# Patient Record
Sex: FEMALE | Race: WHITE | NOT HISPANIC OR LATINO | Employment: FULL TIME
[De-identification: names, ages, dates, MRNs, and addresses within clinical notes are randomized per-mention and may not be internally consistent; named-entity substitution may affect disease eponyms.]

---

## 2019-05-06 ENCOUNTER — TRANSCRIBE ORDERS (OUTPATIENT)
Dept: SCHEDULING | Age: 29
End: 2019-05-06

## 2019-05-06 DIAGNOSIS — Z36.4 ENCOUNTER FOR ANTENATAL SCREENING FOR FETAL GROWTH RETARDATION: Primary | ICD-10-CM

## 2019-05-10 ENCOUNTER — HOSPITAL ENCOUNTER (OUTPATIENT)
Dept: PERINATAL CARE | Facility: HOSPITAL | Age: 29
Discharge: HOME | End: 2019-05-10
Attending: OBSTETRICS & GYNECOLOGY
Payer: COMMERCIAL

## 2019-05-10 VITALS — WEIGHT: 190 LBS | BODY MASS INDEX: 30.53 KG/M2 | HEIGHT: 66 IN

## 2019-05-10 DIAGNOSIS — Z36.4 ENCOUNTER FOR ANTENATAL SCREENING FOR FETAL GROWTH RETARDATION: ICD-10-CM

## 2019-05-10 DIAGNOSIS — O36.80X0 ENCOUNTER TO DETERMINE FETAL VIABILITY OF PREGNANCY, SINGLE OR UNSPECIFIED FETUS: ICD-10-CM

## 2019-05-10 DIAGNOSIS — Z3A.01 PREGNANCY WITH FEWER THAN 8 COMPLETED WEEKS GESTATION: ICD-10-CM

## 2019-05-10 PROCEDURE — 76801 OB US < 14 WKS SINGLE FETUS: CPT

## 2019-05-20 LAB
HBV SURFACE AG SER QL: NONREACTIVE
HIV 1+2 AB+HIV1 P24 AG SERPL QL IA: NONREACTIVE
RUBELLA IGG SCREEN: NORMAL

## 2019-05-24 ENCOUNTER — TRANSCRIBE ORDERS (OUTPATIENT)
Dept: SCHEDULING | Age: 29
End: 2019-05-24

## 2019-05-24 DIAGNOSIS — Z36.82 ENCOUNTER FOR NUCHAL TRANSLUCENCY TESTING: Primary | ICD-10-CM

## 2019-06-27 ENCOUNTER — TRANSCRIBE ORDERS (OUTPATIENT)
Dept: REGISTRATION | Facility: HOSPITAL | Age: 29
End: 2019-06-27

## 2019-06-27 ENCOUNTER — HOSPITAL ENCOUNTER (OUTPATIENT)
Dept: PERINATAL CARE | Facility: HOSPITAL | Age: 29
Discharge: HOME | End: 2019-06-27
Attending: OBSTETRICS & GYNECOLOGY
Payer: COMMERCIAL

## 2019-06-27 DIAGNOSIS — Z36.82 ENCOUNTER FOR NUCHAL TRANSLUCENCY TESTING: Primary | ICD-10-CM

## 2019-06-27 DIAGNOSIS — Z36.82 ENCOUNTER FOR (NT) NUCHAL TRANSLUCENCY SCAN: ICD-10-CM

## 2019-06-27 DIAGNOSIS — Z3A.12 12 WEEKS GESTATION OF PREGNANCY: ICD-10-CM

## 2019-06-27 DIAGNOSIS — O35.10X0 SUSPECTED CHROMOSOME ANOMALY OF FETUS AFFECTING MANAGEMENT OF MOTHER, ANTEPARTUM, SINGLE OR UNSPECIFIED FETUS: ICD-10-CM

## 2019-06-27 DIAGNOSIS — Z36.82 ENCOUNTER FOR NUCHAL TRANSLUCENCY TESTING: ICD-10-CM

## 2019-06-27 PROCEDURE — 76801 OB US < 14 WKS SINGLE FETUS: CPT

## 2019-07-18 ENCOUNTER — TRANSCRIBE ORDERS (OUTPATIENT)
Dept: SCHEDULING | Age: 29
End: 2019-07-18

## 2019-07-18 DIAGNOSIS — Z36.3 ENCOUNTER FOR ANTENATAL SCREENING FOR MALFORMATION: Primary | ICD-10-CM

## 2019-08-20 ENCOUNTER — HOSPITAL ENCOUNTER (OUTPATIENT)
Dept: PERINATAL CARE | Facility: HOSPITAL | Age: 29
Discharge: HOME | End: 2019-08-20
Attending: OBSTETRICS & GYNECOLOGY
Payer: COMMERCIAL

## 2019-08-20 DIAGNOSIS — Z3A.20 20 WEEKS GESTATION OF PREGNANCY: ICD-10-CM

## 2019-08-20 DIAGNOSIS — E03.9 MATERNAL HYPOTHYROIDISM, ANTEPARTUM, SECOND TRIMESTER: ICD-10-CM

## 2019-08-20 DIAGNOSIS — Z36.3 ANTENATAL SCREENING FOR MALFORMATION USING ULTRASONICS: ICD-10-CM

## 2019-08-20 DIAGNOSIS — Z36.86 ENCOUNTER FOR ANTENATAL SCREENING FOR CERVICAL LENGTH: ICD-10-CM

## 2019-08-20 DIAGNOSIS — O35.9XX0 SUSPECTED FETAL ABNORMALITY AFFECTING MANAGEMENT OF MOTHER, ANTEPARTUM, SINGLE OR UNSPECIFIED FETUS: ICD-10-CM

## 2019-08-20 DIAGNOSIS — Z36.3 ENCOUNTER FOR ANTENATAL SCREENING FOR MALFORMATION: ICD-10-CM

## 2019-08-20 DIAGNOSIS — O99.282 MATERNAL HYPOTHYROIDISM, ANTEPARTUM, SECOND TRIMESTER: ICD-10-CM

## 2019-08-20 PROCEDURE — 76811 OB US DETAILED SNGL FETUS: CPT

## 2019-09-27 ENCOUNTER — TRANSCRIBE ORDERS (OUTPATIENT)
Dept: SCHEDULING | Age: 29
End: 2019-09-27

## 2019-09-27 DIAGNOSIS — O99.282 ENDOCRINE, NUTRITIONAL AND METABOLIC DISEASES COMPLICATING PREGNANCY, SECOND TRIMESTER: Primary | ICD-10-CM

## 2019-09-27 DIAGNOSIS — Z3A.28 28 WEEKS GESTATION OF PREGNANCY: ICD-10-CM

## 2019-09-27 LAB — RPR SER QL: NORMAL

## 2019-10-18 ENCOUNTER — HOSPITAL ENCOUNTER (OUTPATIENT)
Dept: PERINATAL CARE | Facility: HOSPITAL | Age: 29
Discharge: HOME | End: 2019-10-18
Attending: OBSTETRICS & GYNECOLOGY
Payer: COMMERCIAL

## 2019-10-18 DIAGNOSIS — O99.282 ENDOCRINE, NUTRITIONAL AND METABOLIC DISEASES COMPLICATING PREGNANCY, SECOND TRIMESTER: ICD-10-CM

## 2019-10-18 DIAGNOSIS — Z3A.29 29 WEEKS GESTATION OF PREGNANCY: ICD-10-CM

## 2019-10-18 DIAGNOSIS — Z3A.28 28 WEEKS GESTATION OF PREGNANCY: ICD-10-CM

## 2019-10-18 PROCEDURE — 76816 OB US FOLLOW-UP PER FETUS: CPT

## 2019-11-27 ENCOUNTER — HOSPITAL ENCOUNTER (OUTPATIENT)
Facility: HOSPITAL | Age: 29
Discharge: HOME | End: 2019-11-27
Attending: OBSTETRICS & GYNECOLOGY | Admitting: OBSTETRICS & GYNECOLOGY
Payer: COMMERCIAL

## 2019-11-27 VITALS
BODY MASS INDEX: 33.59 KG/M2 | SYSTOLIC BLOOD PRESSURE: 122 MMHG | TEMPERATURE: 97.7 F | DIASTOLIC BLOOD PRESSURE: 70 MMHG | HEART RATE: 74 BPM | HEIGHT: 66 IN | WEIGHT: 209 LBS | RESPIRATION RATE: 16 BRPM

## 2019-11-27 PROCEDURE — 4A0HXCZ MEASUREMENT OF PRODUCTS OF CONCEPTION, CARDIAC RATE, EXTERNAL APPROACH: ICD-10-PCS | Performed by: OBSTETRICS & GYNECOLOGY

## 2019-11-27 PROCEDURE — 59025 FETAL NON-STRESS TEST: CPT

## 2019-11-27 SDOH — HEALTH STABILITY: MENTAL HEALTH: HOW OFTEN DO YOU HAVE A DRINK CONTAINING ALCOHOL?: NEVER

## 2019-11-27 NOTE — NURSING NOTE
Pt came in for decreased fetal movement. Pt was put on monitor and baby was reactive with a category 1 strip. Pt stated feeling the baby move while on the monitor. Pt stated feeling no pain. Dr. Murphy reviewed strip and said pt is able to be d/c. Pt given discharge instructions and verbalized understanding and signed consents. Pt left hospital in good condition.

## 2019-12-06 ENCOUNTER — TRANSCRIBE ORDERS (OUTPATIENT)
Dept: SCHEDULING | Age: 29
End: 2019-12-06

## 2019-12-06 LAB — GP B STREP SPEC QL CULT: NORMAL

## 2019-12-10 ENCOUNTER — HOSPITAL ENCOUNTER (OUTPATIENT)
Dept: PERINATAL CARE | Facility: HOSPITAL | Age: 29
Discharge: HOME | End: 2019-12-10
Attending: OBSTETRICS & GYNECOLOGY
Payer: COMMERCIAL

## 2019-12-10 DIAGNOSIS — Z3A.36 36 WEEKS GESTATION OF PREGNANCY: ICD-10-CM

## 2019-12-10 PROCEDURE — 76816 OB US FOLLOW-UP PER FETUS: CPT

## 2019-12-28 ENCOUNTER — HOSPITAL ENCOUNTER (OUTPATIENT)
Facility: HOSPITAL | Age: 29
Discharge: HOME | End: 2019-12-28
Attending: OBSTETRICS & GYNECOLOGY | Admitting: OBSTETRICS & GYNECOLOGY
Payer: COMMERCIAL

## 2019-12-28 VITALS
TEMPERATURE: 97.6 F | SYSTOLIC BLOOD PRESSURE: 124 MMHG | HEART RATE: 90 BPM | DIASTOLIC BLOOD PRESSURE: 79 MMHG | RESPIRATION RATE: 18 BRPM

## 2019-12-28 PROBLEM — Z3A.39 39 WEEKS GESTATION OF PREGNANCY: Status: ACTIVE | Noted: 2019-12-28

## 2019-12-28 PROBLEM — O36.8130 DECREASED FETAL MOVEMENTS IN THIRD TRIMESTER: Status: ACTIVE | Noted: 2019-12-28

## 2019-12-28 PROCEDURE — 59025 FETAL NON-STRESS TEST: CPT

## 2019-12-28 NOTE — NURSING NOTE
"Pt presents to triage with complaint of decreased fetal movement. NST reactive. Pt denies leaking of fluid, vaginal bleeding, and contractions at this time. md jean reviewing strip and talking with pt at bedside. Plan of care discussed. Amniotic fluid assessed by md jean using bedside ultrasound. md jean discharging pt to home. Pt states she has \"no concerns or questions at this time.\" pt ambulates off floor with .   "

## 2019-12-29 NOTE — H&P
Obstetrics H&P    HPI     Luna Felix is a 29 y.o. female  at 39w1d with an estimated due date of 1/3/2020, who presents reporting decreased fetal movement today. Reports is feeling the baby move, but it is a little less than normal. Denies contractions, bleeding or leakage of fluid.    OB History:   OB History    Para Term  AB Living   1 0 0 0 0 0   SAB TAB Ectopic Multiple Live Births   0 0 0 0 0      # Outcome Date GA Lbr Papi/2nd Weight Sex Delivery Anes PTL Lv   1 Current                Medical History:   Past Medical History:   Diagnosis Date   • Disease of thyroid gland        Surgical History:   Past Surgical History:   Procedure Laterality Date   • WISDOM TOOTH EXTRACTION         Social History:   Social History     Socioeconomic History   • Marital status:      Spouse name: Robert   • Number of children: Not on file   • Years of education: Not on file   • Highest education level: Not on file   Occupational History   • Not on file   Social Needs   • Financial resource strain: Not on file   • Food insecurity:     Worry: Not on file     Inability: Not on file   • Transportation needs:     Medical: Not on file     Non-medical: Not on file   Tobacco Use   • Smoking status: Never Smoker   • Smokeless tobacco: Never Used   Substance and Sexual Activity   • Alcohol use: Never     Frequency: Never   • Drug use: Never   • Sexual activity: Yes     Partners: Male     Birth control/protection: None   Lifestyle   • Physical activity:     Days per week: Not on file     Minutes per session: Not on file   • Stress: Not on file   Relationships   • Social connections:     Talks on phone: Not on file     Gets together: Not on file     Attends [employer] service: Not on file     Active member of club or organization: Not on file     Attends meetings of clubs or organizations: Not on file     Relationship status: Not on file   • Intimate partner violence:     Fear of current or ex partner: Not  on file     Emotionally abused: Not on file     Physically abused: Not on file     Forced sexual activity: Not on file   Other Topics Concern   • Not on file   Social History Narrative   • Not on file        Family History:   Family History   Problem Relation Age of Onset   • Stroke Paternal Grandfather    • Cancer Paternal Grandmother    • Cancer Maternal Grandmother    • Stroke Maternal Grandfather    • Hypertension Biological Father        Allergies: Ceclor [cefaclor]    Prior to Admission medications    Not on File       Review of Systems  Pertinent items are noted in HPI.    Objective     Vital Signs for the last 24 hours:  Temp:  [36.4 °C (97.6 °F)] 36.4 °C (97.6 °F)  Heart Rate:  [90] 90  Resp:  [18] 18  BP: (124)/(79) 124/79    Latest cervical exam:                Additional Tests:   Sterile Speculum Exam: no    Fetal Monitoring:  FHR Baseline: 120  FHR Variability: moderate  FHR Accelerations: present  FHR Decelerations: absent    Contraction Frequency: irregular    Exam:  General Appearance: Alert, cooperative, no acute distress  Lungs: Respirations unlabored  Abdomen: gravid, nontender  Genitalia: Deferred  Extremities: no edema or calf tenderness  Neurologic: grossly intact without focal deficits    Bedside Ultrasounds:   cephalic  Deepest vertical pocket = 9cm        Assessment/Plan     Luna Felix is a 29 y.o. female  at 39w1d admitted for observation for decreased fetal movement    1) FHR: Category I, reactive  2) Borderline elevated deepest vertical pocket of 9 cm, plan for outpatient PTC scan scan for formal AIDA and growth scan.  3) DFM: FHT Category 1 and reactive, kick count precautions reviewed      Anjelica Murphy MD  2019  7:32 PM

## 2019-12-31 ENCOUNTER — HOSPITAL ENCOUNTER (OUTPATIENT)
Facility: HOSPITAL | Age: 29
Discharge: HOME | End: 2019-12-31
Attending: OBSTETRICS & GYNECOLOGY | Admitting: OBSTETRICS & GYNECOLOGY
Payer: COMMERCIAL

## 2019-12-31 VITALS
DIASTOLIC BLOOD PRESSURE: 78 MMHG | WEIGHT: 213 LBS | RESPIRATION RATE: 16 BRPM | SYSTOLIC BLOOD PRESSURE: 125 MMHG | TEMPERATURE: 97.5 F | BODY MASS INDEX: 34.23 KG/M2 | HEART RATE: 68 BPM | HEIGHT: 66 IN

## 2019-12-31 LAB
ALT SERPL-CCNC: 13 IU/L (ref 11–54)
AST SERPL-CCNC: 20 IU/L (ref 15–41)
CREAT SERPL-MCNC: 0.6 MG/DL
CREAT UR-MCNC: 13.7 MG/DL
ERYTHROCYTE [DISTWIDTH] IN BLOOD BY AUTOMATED COUNT: 14.2 % (ref 11.7–14.4)
GFR SERPL CREATININE-BSD FRML MDRD: >60 ML/MIN/1.73M*2
HCT VFR BLDCO AUTO: 36.1 %
HGB BLD-MCNC: 12.1 G/DL (ref 11.8–15.7)
LDH SERPL L TO P-CCNC: 139 IU/L (ref 98–192)
MCH RBC QN AUTO: 28.5 PG (ref 28–33.2)
MCHC RBC AUTO-ENTMCNC: 33.5 G/DL (ref 32.2–35.5)
MCV RBC AUTO: 84.9 FL (ref 83–98)
PDW BLD AUTO: 13.1 FL (ref 9.4–12.3)
PLATELET # BLD AUTO: 227 K/UL
PROT UR-MCNC: <6 MG/DL
PROT/CREAT UR: NORMAL MG/G{CREAT}
RBC # BLD AUTO: 4.25 M/UL (ref 3.93–5.22)
URATE SERPL-MCNC: 5.5 MG/DL (ref 2.6–8)
WBC # BLD AUTO: 9.23 K/UL

## 2019-12-31 PROCEDURE — 36415 COLL VENOUS BLD VENIPUNCTURE: CPT | Performed by: OBSTETRICS & GYNECOLOGY

## 2019-12-31 PROCEDURE — 84460 ALANINE AMINO (ALT) (SGPT): CPT | Performed by: OBSTETRICS & GYNECOLOGY

## 2019-12-31 PROCEDURE — 84450 TRANSFERASE (AST) (SGOT): CPT | Performed by: OBSTETRICS & GYNECOLOGY

## 2019-12-31 PROCEDURE — 63700000 HC SELF-ADMINISTRABLE DRUG: Performed by: OBSTETRICS & GYNECOLOGY

## 2019-12-31 PROCEDURE — 84156 ASSAY OF PROTEIN URINE: CPT | Performed by: OBSTETRICS & GYNECOLOGY

## 2019-12-31 PROCEDURE — 83615 LACTATE (LD) (LDH) ENZYME: CPT | Performed by: OBSTETRICS & GYNECOLOGY

## 2019-12-31 PROCEDURE — 84550 ASSAY OF BLOOD/URIC ACID: CPT | Performed by: OBSTETRICS & GYNECOLOGY

## 2019-12-31 PROCEDURE — 82565 ASSAY OF CREATININE: CPT | Performed by: OBSTETRICS & GYNECOLOGY

## 2019-12-31 PROCEDURE — 85027 COMPLETE CBC AUTOMATED: CPT | Performed by: OBSTETRICS & GYNECOLOGY

## 2019-12-31 RX ORDER — BUTALBITAL, ACETAMINOPHEN AND CAFFEINE 50; 325; 40 MG/1; MG/1; MG/1
1 TABLET ORAL AS NEEDED
Status: DISCONTINUED | OUTPATIENT
Start: 2019-12-31 | End: 2020-01-01 | Stop reason: HOSPADM

## 2019-12-31 RX ADMIN — BUTALBITAL, ACETAMINOPHEN AND CAFFEINE 1 TABLET: 50; 325; 40 TABLET ORAL at 19:19

## 2019-12-31 SDOH — ECONOMIC STABILITY: FOOD INSECURITY: WITHIN THE PAST 12 MONTHS, THE FOOD YOU BOUGHT JUST DIDN'T LAST AND YOU DIDN'T HAVE MONEY TO GET MORE.: NEVER TRUE

## 2019-12-31 SDOH — ECONOMIC STABILITY: FOOD INSECURITY: WITHIN THE PAST 12 MONTHS, YOU WORRIED THAT YOUR FOOD WOULD RUN OUT BEFORE YOU GOT THE MONEY TO BUY MORE.: NEVER TRUE

## 2019-12-31 SDOH — ECONOMIC STABILITY: FOOD INSECURITY: HOW HARD IS IT FOR YOU TO PAY FOR THE VERY BASICS LIKE FOOD, HOUSING, MEDICAL CARE, AND HEATING?: NOT HARD AT ALL

## 2019-12-31 SDOH — ECONOMIC STABILITY: TRANSPORTATION INSECURITY: IN THE PAST 12 MONTHS, HAS LACK OF TRANSPORTATION KEPT YOU FROM MEDICAL APPOINTMENTS OR FROM GETTING MEDICATIONS?: NO

## 2019-12-31 ASSESSMENT — ACTIVITIES OF DAILY LIVING (ADL): LACK_OF_TRANSPORTATION: NO

## 2019-12-31 NOTE — NURSING NOTE
Pt states she feels some leaking of fluid since this am. NO gush of water but occasional leaking. +fetal movement. Cat 1 tracing, afebril. C/O headache with history of migranes. NO migraine since second trimester. Light sensitivity noted. Pain 6/10 in head. Denies contractions. Uterine irritability noted on toco. 's/80's. Baseline in office 120/70's. Dr Murphy aware of above and coming in to assess pt.

## 2020-01-01 NOTE — H&P
Obstetrics H&P    HPI     Luna Felix is a 29 y.o. female  at 39w4d with an estimated due date of 1/3/2020, who presents complaining of a headache all day and mild nausea, reports a history of migraines in the past, has not taken any Tylenol or other medication for headache. Reports good fetal movement, denies bleeding, but has noticed some watery discharge since this morning.    OB History:   OB History    Para Term  AB Living   1 0 0 0 0 0   SAB TAB Ectopic Multiple Live Births   0 0 0 0 0      # Outcome Date GA Lbr Papi/2nd Weight Sex Delivery Anes PTL Lv   1 Current                Medical History:   Past Medical History:   Diagnosis Date   • Chronic migraine    • Disease of thyroid gland        Surgical History:   Past Surgical History:   Procedure Laterality Date   • WISDOM TOOTH EXTRACTION         Social History:   Social History     Socioeconomic History   • Marital status:      Spouse name: Robert   • Number of children: None   • Years of education: None   • Highest education level: None   Occupational History   • None   Social Needs   • Financial resource strain: Not hard at all   • Food insecurity:     Worry: Never true     Inability: Never true   • Transportation needs:     Medical: No     Non-medical: No   Tobacco Use   • Smoking status: Never Smoker   • Smokeless tobacco: Never Used   Substance and Sexual Activity   • Alcohol use: Never     Frequency: Never   • Drug use: Never   • Sexual activity: Yes     Partners: Male     Birth control/protection: None   Lifestyle   • Physical activity:     Days per week: None     Minutes per session: None   • Stress: None   Relationships   • Social connections:     Talks on phone: None     Gets together: None     Attends [employer] service: None     Active member of club or organization: None     Attends meetings of clubs or organizations: None     Relationship status: None   • Intimate partner violence:     Fear of current or ex  partner: None     Emotionally abused: None     Physically abused: None     Forced sexual activity: None   Other Topics Concern   • None   Social History Narrative   • None        Family History:   Family History   Problem Relation Age of Onset   • Stroke Paternal Grandfather    • Cancer Paternal Grandmother    • Cancer Maternal Grandmother    • Stroke Maternal Grandfather    • Hypertension Biological Father        Allergies: Ceclor [cefaclor]    Prior to Admission medications    Not on File       Review of Systems  Pertinent items are noted in HPI.    Objective     Vital Signs for the last 24 hours:  Temp:  [36.4 °C (97.5 °F)] 36.4 °C (97.5 °F)  Heart Rate:  [74-82] 82  Resp:  [16] 16  BP: (130-132)/(82-83) 132/83    Latest cervical exam:  Cervical Dilation (cm): 0        Method: sterile exam per physician (19)    Additional Tests:   Sterile Speculum Exam: yes  Pooling: no  Nitrazine: no  Ferning: no  Bleeding: no      Fetal Monitoring:  FHR Baseline: 120  FHR Variability: moderate  FHR Accelerations: present  FHR Decelerations: absent    Contraction Frequency: acontractile    Exam:  General Appearance: Alert, cooperative, no acute distress  Lungs: Respirations unlabored  Heart: Regular rate and rhythm  Abdomen: gravid, nontender  Genitalia: See vaginal exam  Extremities: no edema or calf tenderness  Neurologic: grossly intact without focal deficits, +1 upper and lower extremity reflexes      Bedside Ultrasounds:   cephalic  Deepest vertical pocket = 8.6 cm      Assessment/Plan     Luna Felix is a 29 y.o. female  at 39w4d admitted for evaluation of pre-eclampsia with headache and nausea     1) FHR: Category I  2) PIH: normal range BP, suspect headache and nausea are related to migraine, will get PIH labs and try Fioricet, c/w serial BP monitoring.   3) LOF: normal DVP on bedside u/s, exam negative for nit/pool/fern, no acute concern for ROM    Anjelica Murphy MD  2019  7:10  PM

## 2020-01-06 ENCOUNTER — HOSPITAL ENCOUNTER (INPATIENT)
Facility: HOSPITAL | Age: 30
LOS: 5 days | Discharge: HOME | End: 2020-01-11
Attending: OBSTETRICS & GYNECOLOGY | Admitting: OBSTETRICS & GYNECOLOGY
Payer: COMMERCIAL

## 2020-01-06 DIAGNOSIS — O61.0 FAILED MEDICAL INDUCTION OF LABOR: Primary | ICD-10-CM

## 2020-01-06 PROBLEM — Z34.90 ENCOUNTER FOR ELECTIVE INDUCTION OF LABOR: Status: ACTIVE | Noted: 2020-01-06

## 2020-01-06 LAB
ABO + RH BLD: NORMAL
ABO + RH BLD: NORMAL
BLD GP AB SCN SERPL QL: NEGATIVE
D AG BLD QL: POSITIVE
D AG BLD QL: POSITIVE
ERYTHROCYTE [DISTWIDTH] IN BLOOD BY AUTOMATED COUNT: 14.5 % (ref 11.7–14.4)
HCT VFR BLDCO AUTO: 33.4 %
HGB BLD-MCNC: 11.2 G/DL (ref 11.8–15.7)
LABORATORY COMMENT REPORT: NORMAL
LABORATORY COMMENT REPORT: NORMAL
MCH RBC QN AUTO: 28.6 PG (ref 28–33.2)
MCHC RBC AUTO-ENTMCNC: 33.5 G/DL (ref 32.2–35.5)
MCV RBC AUTO: 85.4 FL (ref 83–98)
PDW BLD AUTO: 13.6 FL (ref 9.4–12.3)
PLATELET # BLD AUTO: 210 K/UL
RBC # BLD AUTO: 3.91 M/UL (ref 3.93–5.22)
WBC # BLD AUTO: 10.31 K/UL

## 2020-01-06 PROCEDURE — 63700000 HC SELF-ADMINISTRABLE DRUG

## 2020-01-06 PROCEDURE — 36415 COLL VENOUS BLD VENIPUNCTURE: CPT | Performed by: OBSTETRICS & GYNECOLOGY

## 2020-01-06 PROCEDURE — 12000000 HC ROOM AND CARE MED/SURG

## 2020-01-06 PROCEDURE — 86901 BLOOD TYPING SEROLOGIC RH(D): CPT

## 2020-01-06 PROCEDURE — 85027 COMPLETE CBC AUTOMATED: CPT | Performed by: OBSTETRICS & GYNECOLOGY

## 2020-01-06 PROCEDURE — 86780 TREPONEMA PALLIDUM: CPT | Performed by: OBSTETRICS & GYNECOLOGY

## 2020-01-06 RX ORDER — SODIUM CHLORIDE, SODIUM LACTATE, POTASSIUM CHLORIDE, CALCIUM CHLORIDE 600; 310; 30; 20 MG/100ML; MG/100ML; MG/100ML; MG/100ML
125 INJECTION, SOLUTION INTRAVENOUS CONTINUOUS
Status: DISCONTINUED | OUTPATIENT
Start: 2020-01-06 | End: 2020-01-11 | Stop reason: HOSPADM

## 2020-01-06 RX ORDER — LEVOTHYROXINE SODIUM 125 UG/1
125 TABLET ORAL
COMMUNITY
End: 2024-10-21 | Stop reason: SDUPTHER

## 2020-01-06 RX ADMIN — DINOPROSTONE 10 MG: 10 INSERT VAGINAL at 20:06

## 2020-01-07 ENCOUNTER — ANESTHESIA (INPATIENT)
Dept: OBSTETRICS AND GYNECOLOGY | Facility: HOSPITAL | Age: 30
End: 2020-01-07
Payer: COMMERCIAL

## 2020-01-07 ENCOUNTER — ANESTHESIA EVENT (INPATIENT)
Dept: OBSTETRICS AND GYNECOLOGY | Facility: HOSPITAL | Age: 30
End: 2020-01-07
Payer: COMMERCIAL

## 2020-01-07 LAB — T PALLIDUM AB SER QL IF: NONREACTIVE

## 2020-01-07 PROCEDURE — 3E033VJ INTRODUCTION OF OTHER HORMONE INTO PERIPHERAL VEIN, PERCUTANEOUS APPROACH: ICD-10-PCS | Performed by: OBSTETRICS & GYNECOLOGY

## 2020-01-07 PROCEDURE — 63700000 HC SELF-ADMINISTRABLE DRUG

## 2020-01-07 PROCEDURE — 25000000 HC PHARMACY GENERAL: Performed by: OBSTETRICS & GYNECOLOGY

## 2020-01-07 PROCEDURE — 12000000 HC ROOM AND CARE MED/SURG

## 2020-01-07 PROCEDURE — 63700000 HC SELF-ADMINISTRABLE DRUG: Performed by: OBSTETRICS & GYNECOLOGY

## 2020-01-07 PROCEDURE — 27200130 HC EPIDURAL ANES TRAY

## 2020-01-07 PROCEDURE — 25000000 HC PHARMACY GENERAL

## 2020-01-07 PROCEDURE — 3E0P7VZ INTRODUCTION OF HORMONE INTO FEMALE REPRODUCTIVE, VIA NATURAL OR ARTIFICIAL OPENING: ICD-10-PCS | Performed by: OBSTETRICS & GYNECOLOGY

## 2020-01-07 PROCEDURE — 25000000 HC PHARMACY GENERAL: Performed by: ANESTHESIOLOGY

## 2020-01-07 PROCEDURE — 63600000 HC DRUGS/DETAIL CODE: Performed by: OBSTETRICS & GYNECOLOGY

## 2020-01-07 RX ORDER — LIDOCAINE HYDROCHLORIDE AND EPINEPHRINE 15; 5 MG/ML; UG/ML
INJECTION, SOLUTION EPIDURAL AS NEEDED
Status: DISCONTINUED | OUTPATIENT
Start: 2020-01-07 | End: 2020-01-08 | Stop reason: SURG

## 2020-01-07 RX ORDER — SODIUM CHLORIDE, SODIUM LACTATE, POTASSIUM CHLORIDE, CALCIUM CHLORIDE 600; 310; 30; 20 MG/100ML; MG/100ML; MG/100ML; MG/100ML
125 INJECTION, SOLUTION INTRAVENOUS CONTINUOUS
Status: ACTIVE | OUTPATIENT
Start: 2020-01-07 | End: 2020-01-08

## 2020-01-07 RX ORDER — MISOPROSTOL 100 UG/1
25 TABLET ORAL ONCE
Status: COMPLETED | OUTPATIENT
Start: 2020-01-07 | End: 2020-01-07

## 2020-01-07 RX ORDER — EPHEDRINE SULFATE/0.9% NACL/PF 50 MG/5 ML
SYRINGE (ML) INTRAVENOUS
Status: DISPENSED
Start: 2020-01-07 | End: 2020-01-08

## 2020-01-07 RX ORDER — OXYTOCIN/0.9 % SODIUM CHLORIDE 30/500 ML
0-20 PLASTIC BAG, INJECTION (ML) INTRAVENOUS CONTINUOUS
Status: DISPENSED | OUTPATIENT
Start: 2020-01-07 | End: 2020-01-11

## 2020-01-07 RX ORDER — MISOPROSTOL 100 UG/1
50 TABLET ORAL EVERY 4 HOURS
Status: DISCONTINUED | OUTPATIENT
Start: 2020-01-07 | End: 2020-01-09 | Stop reason: HOSPADM

## 2020-01-07 RX ORDER — MISOPROSTOL 100 UG/1
TABLET ORAL
Status: COMPLETED
Start: 2020-01-07 | End: 2020-01-07

## 2020-01-07 RX ADMIN — Medication 50 ML: at 19:23

## 2020-01-07 RX ADMIN — OXYTOCIN-SODIUM CHLORIDE 0.9% IV SOLN 30 UNIT/500ML 1 MILLI-UNITS/MIN: 30-0.9/5 SOLUTION at 21:10

## 2020-01-07 RX ADMIN — MISOPROSTOL 25 MCG: 100 TABLET ORAL at 12:08

## 2020-01-07 RX ADMIN — LIDOCAINE HYDROCHLORIDE,EPINEPHRINE BITARTRATE 5 ML: 15; .005 INJECTION, SOLUTION EPIDURAL; INFILTRATION; INTRACAUDAL; PERINEURAL at 19:56

## 2020-01-07 RX ADMIN — SODIUM CHLORIDE, POTASSIUM CHLORIDE, SODIUM LACTATE AND CALCIUM CHLORIDE 125 ML/HR: 600; 310; 30; 20 INJECTION, SOLUTION INTRAVENOUS at 12:15

## 2020-01-07 RX ADMIN — MISOPROSTOL 50 MCG: 100 TABLET ORAL at 08:10

## 2020-01-07 RX ADMIN — Medication 8 ML/HR: at 19:56

## 2020-01-07 RX ADMIN — Medication 50 ML: at 23:35

## 2020-01-07 RX ADMIN — LEVOTHYROXINE SODIUM 125 MCG: 0.03 TABLET ORAL at 09:03

## 2020-01-07 NOTE — H&P
Obstetrics H&P    HPI     Luna Felix is a 29 y.o. female  at 40w3d with an estimated due date of 1/3/2020, by Other Basis who presents for elective induction      OB History:   OB History    Para Term  AB Living   1 0 0 0 0 0   SAB TAB Ectopic Multiple Live Births   0 0 0 0 0      # Outcome Date GA Lbr Papi/2nd Weight Sex Delivery Anes PTL Lv   1 Current                Medical History:   Past Medical History:   Diagnosis Date   • Chronic migraine    • Disease of thyroid gland        Surgical History:   Past Surgical History:   Procedure Laterality Date   • WISDOM TOOTH EXTRACTION         Social History:   Social History     Socioeconomic History   • Marital status:      Spouse name: Robert   • Number of children: Not on file   • Years of education: Not on file   • Highest education level: Not on file   Occupational History   • Not on file   Social Needs   • Financial resource strain: Not hard at all   • Food insecurity:     Worry: Never true     Inability: Never true   • Transportation needs:     Medical: No     Non-medical: No   Tobacco Use   • Smoking status: Never Smoker   • Smokeless tobacco: Never Used   Substance and Sexual Activity   • Alcohol use: Never     Frequency: Never   • Drug use: Never   • Sexual activity: Yes     Partners: Male     Birth control/protection: None   Lifestyle   • Physical activity:     Days per week: Not on file     Minutes per session: Not on file   • Stress: Not on file   Relationships   • Social connections:     Talks on phone: Not on file     Gets together: Not on file     Attends [employer] service: Not on file     Active member of club or organization: Not on file     Attends meetings of clubs or organizations: Not on file     Relationship status: Not on file   • Intimate partner violence:     Fear of current or ex partner: Not on file     Emotionally abused: Not on file     Physically abused: Not on file     Forced sexual activity: Not on file    Other Topics Concern   • Not on file   Social History Narrative   • Not on file        Family History:   Family History   Problem Relation Age of Onset   • Stroke Paternal Grandfather    • Cancer Paternal Grandmother    • Cancer Maternal Grandmother    • Stroke Maternal Grandfather    • Hypertension Biological Father        Allergies: Ceclor [cefaclor]    Prior to Admission medications    Not on File       Review of Systems  Pertinent items are noted in HPI.    Objective     Vital Signs for the last 24 hours:  Temp:  [36.4 °C (97.6 °F)] 36.4 °C (97.6 °F)  Heart Rate:  [83] 83  BP: (130)/(79) 130/79    Latest cervical exam: closed/long/high  U/s - vtx        Contraction Frequency: irregular    Exam:  General Appearance: Alert, cooperative, no acute distress  Lungs: Respirations unlabored  Heart: Regular rate   Abdomen: gravid, nontender  Genitalia: See vaginal exam  Extremities: no edema or calf tenderness  Neurologic: grossly intact without focal deficits      Labs:  No results found for: ABO, LABRH, RUBELLAIGGQT, GBS  Lab Results   Component Value Date    WBC 9.23 2019    HGB 12.1 2019    HCT 36.1 2019    MCV 84.9 2019     2019       Assessment/Plan     Luna Felix is a 29 y.o. female  at 40w3d admitted for elective  induction of labor     1) FHR: Category I  2) GBS: negative  3) cervidil placed    Nava Forrest MD  2020  8:20 PM

## 2020-01-07 NOTE — PROGRESS NOTES
Labor and Delivery Progress Note    S: comfortable    Vital Signs:  Vitals:    20 0906   BP: 132/71   Pulse: 62   Resp:    Temp:        Fetal Monitoring:  FHR Baseline: 130  FHR Variability: moderate  FHR Accelerations: present  FHR Decelerations: absent    [place]: q2-4 mild    Cvx: 1/long/high    Assessment/Plan     Luna Felix is a 29 y.o. female  at 40w4d admitted with induction of labor     Labor: cervix unchanged. Haim but not feeling them. Ok for next dose cytotec,  FHR: Category I  GBS: negative    Karen Silva MD

## 2020-01-07 NOTE — PROGRESS NOTES
Labor and Delivery Progress Note    S: still comfortable    Vital Signs:  Vitals:    20 1314   BP: 121/68   Pulse: 67   Resp:    Temp:        Fetal Monitoring:  FHR Baseline: 130  FHR Variability: moderate  FHR Accelerations: present  FHR Decelerations: absent    [place]: q2-4 mild    Cvx: /-3    Assessment/Plan     Luna Felix is a 29 y.o. female  at 40w4d admitted with induction of labor     Labor: s/p cervidil and cytotec x2. Will eat, walk, reassess after a break.  FHR: Category I  GBS: negative    Karen Silva MD

## 2020-01-07 NOTE — PROGRESS NOTES
Labor and Delivery Progress Note    S: comfortable    Vital Signs:  Vitals:    20 0518   BP: 120/73   Pulse: 69   Temp:        Fetal Monitoring:  FHR Baseline: 120  FHR Variability: moderate  FHR Accelerations: present  FHR Decelerations: absent    [place]: q2    Cvx: 1/long/high per Dr Forrest    Assessment/Plan     Luna Felix is a 29 y.o. female  at 40w4d admitted with induction of labor     Labor: cytotec 50mcg placed  FHR: Category I  GBS: negative    Karen Silva MD

## 2020-01-08 PROBLEM — Z3A.39 39 WEEKS GESTATION OF PREGNANCY: Status: RESOLVED | Noted: 2019-12-28 | Resolved: 2020-01-08

## 2020-01-08 PROBLEM — O61.0 FAILED MEDICAL INDUCTION OF LABOR: Status: RESOLVED | Noted: 2019-12-31 | Resolved: 2020-01-08

## 2020-01-08 PROBLEM — Z34.90 ENCOUNTER FOR ELECTIVE INDUCTION OF LABOR: Status: RESOLVED | Noted: 2020-01-06 | Resolved: 2020-01-08

## 2020-01-08 PROBLEM — O61.0 FAILED MEDICAL INDUCTION OF LABOR: Status: ACTIVE | Noted: 2019-12-31

## 2020-01-08 PROBLEM — O36.8130 DECREASED FETAL MOVEMENTS IN THIRD TRIMESTER: Status: RESOLVED | Noted: 2019-12-28 | Resolved: 2020-01-08

## 2020-01-08 LAB
ALT SERPL-CCNC: 13 IU/L (ref 11–54)
AST SERPL-CCNC: 21 IU/L (ref 15–41)
CREAT SERPL-MCNC: 0.8 MG/DL
CREAT UR-MCNC: 104.7 MG/DL
ERYTHROCYTE [DISTWIDTH] IN BLOOD BY AUTOMATED COUNT: 14.5 % (ref 11.7–14.4)
GFR SERPL CREATININE-BSD FRML MDRD: >60 ML/MIN/1.73M*2
HCT VFR BLDCO AUTO: 34.4 %
HGB BLD-MCNC: 11.5 G/DL (ref 11.8–15.7)
LDH SERPL L TO P-CCNC: 167 IU/L (ref 98–192)
MCH RBC QN AUTO: 28.5 PG (ref 28–33.2)
MCHC RBC AUTO-ENTMCNC: 33.4 G/DL (ref 32.2–35.5)
MCV RBC AUTO: 85.4 FL (ref 83–98)
PDW BLD AUTO: 13.7 FL (ref 9.4–12.3)
PLATELET # BLD AUTO: 203 K/UL
PROT UR-MCNC: 6 MG/DL
PROT/CREAT UR: 0.06 MG/G CREAT
RBC # BLD AUTO: 4.03 M/UL (ref 3.93–5.22)
URATE SERPL-MCNC: 6.3 MG/DL (ref 2.6–8)
WBC # BLD AUTO: 12.06 K/UL

## 2020-01-08 PROCEDURE — 84460 ALANINE AMINO (ALT) (SGPT): CPT | Performed by: OBSTETRICS & GYNECOLOGY

## 2020-01-08 PROCEDURE — 84550 ASSAY OF BLOOD/URIC ACID: CPT | Performed by: OBSTETRICS & GYNECOLOGY

## 2020-01-08 PROCEDURE — 84156 ASSAY OF PROTEIN URINE: CPT | Performed by: OBSTETRICS & GYNECOLOGY

## 2020-01-08 PROCEDURE — 82565 ASSAY OF CREATININE: CPT | Performed by: OBSTETRICS & GYNECOLOGY

## 2020-01-08 PROCEDURE — 27200130 HC EPIDURAL ANES TRAY

## 2020-01-08 PROCEDURE — 63600000 HC DRUGS/DETAIL CODE: Performed by: ANESTHESIOLOGY

## 2020-01-08 PROCEDURE — 63600000 HC DRUGS/DETAIL CODE: Performed by: NURSE ANESTHETIST, CERTIFIED REGISTERED

## 2020-01-08 PROCEDURE — 25000000 HC PHARMACY GENERAL: Performed by: ANESTHESIOLOGY

## 2020-01-08 PROCEDURE — 63700000 HC SELF-ADMINISTRABLE DRUG: Performed by: OBSTETRICS & GYNECOLOGY

## 2020-01-08 PROCEDURE — 85027 COMPLETE CBC AUTOMATED: CPT | Performed by: OBSTETRICS & GYNECOLOGY

## 2020-01-08 PROCEDURE — 72000021 HC C SECTION LEVEL 1: Performed by: OBSTETRICS & GYNECOLOGY

## 2020-01-08 PROCEDURE — 63600000 HC DRUGS/DETAIL CODE

## 2020-01-08 PROCEDURE — 25000000 HC PHARMACY GENERAL: Performed by: OBSTETRICS & GYNECOLOGY

## 2020-01-08 PROCEDURE — 36415 COLL VENOUS BLD VENIPUNCTURE: CPT | Performed by: OBSTETRICS & GYNECOLOGY

## 2020-01-08 PROCEDURE — 63600000 HC DRUGS/DETAIL CODE: Performed by: OBSTETRICS & GYNECOLOGY

## 2020-01-08 PROCEDURE — 27200121 HC CATH FOLEY

## 2020-01-08 PROCEDURE — 12000000 HC ROOM AND CARE MED/SURG

## 2020-01-08 PROCEDURE — 84450 TRANSFERASE (AST) (SGOT): CPT | Performed by: OBSTETRICS & GYNECOLOGY

## 2020-01-08 PROCEDURE — 83615 LACTATE (LD) (LDH) ENZYME: CPT | Performed by: OBSTETRICS & GYNECOLOGY

## 2020-01-08 PROCEDURE — 25000000 HC PHARMACY GENERAL: Performed by: NURSE ANESTHETIST, CERTIFIED REGISTERED

## 2020-01-08 PROCEDURE — 37000005 HC ANESTHESIA EPIDURAL/SPINAL: Performed by: OBSTETRICS & GYNECOLOGY

## 2020-01-08 PROCEDURE — 71000001 HC PACU PHASE 1 INITIAL 30MIN: Performed by: OBSTETRICS & GYNECOLOGY

## 2020-01-08 PROCEDURE — 71000011 HC PACU PHASE 1 EA ADDL MIN: Performed by: OBSTETRICS & GYNECOLOGY

## 2020-01-08 PROCEDURE — 63700000 HC SELF-ADMINISTRABLE DRUG

## 2020-01-08 RX ORDER — MORPHINE SULFATE 1 MG/ML
INJECTION, SOLUTION EPIDURAL; INTRATHECAL; INTRAVENOUS AS NEEDED
Status: DISCONTINUED | OUTPATIENT
Start: 2020-01-08 | End: 2020-01-08 | Stop reason: SURG

## 2020-01-08 RX ORDER — KETOROLAC TROMETHAMINE 30 MG/ML
30 INJECTION, SOLUTION INTRAMUSCULAR; INTRAVENOUS
Status: DISCONTINUED | OUTPATIENT
Start: 2020-01-08 | End: 2020-01-10

## 2020-01-08 RX ORDER — OXYCODONE HYDROCHLORIDE 5 MG/1
5-10 TABLET ORAL EVERY 4 HOURS PRN
Status: DISCONTINUED | OUTPATIENT
Start: 2020-01-08 | End: 2020-01-11 | Stop reason: HOSPADM

## 2020-01-08 RX ORDER — BUPIVACAINE HYDROCHLORIDE 2.5 MG/ML
INJECTION, SOLUTION EPIDURAL; INFILTRATION; INTRACAUDAL
Status: COMPLETED | OUTPATIENT
Start: 2020-01-08 | End: 2020-01-08

## 2020-01-08 RX ORDER — ONDANSETRON 4 MG/1
4 TABLET, ORALLY DISINTEGRATING ORAL EVERY 8 HOURS PRN
Status: DISCONTINUED | OUTPATIENT
Start: 2020-01-08 | End: 2020-01-11 | Stop reason: HOSPADM

## 2020-01-08 RX ORDER — SODIUM CHLORIDE, SODIUM LACTATE, POTASSIUM CHLORIDE, CALCIUM CHLORIDE 600; 310; 30; 20 MG/100ML; MG/100ML; MG/100ML; MG/100ML
80 INJECTION, SOLUTION INTRAVENOUS CONTINUOUS
Status: ACTIVE | OUTPATIENT
Start: 2020-01-09 | End: 2020-01-09

## 2020-01-08 RX ORDER — IBUPROFEN 600 MG/1
600 TABLET ORAL
Status: DISCONTINUED | OUTPATIENT
Start: 2020-01-11 | End: 2020-01-10

## 2020-01-08 RX ORDER — ONDANSETRON 8 MG/1
8 TABLET, ORALLY DISINTEGRATING ORAL ONCE
Status: COMPLETED | OUTPATIENT
Start: 2020-01-08 | End: 2020-01-08

## 2020-01-08 RX ORDER — SODIUM CHLORIDE, SODIUM LACTATE, POTASSIUM CHLORIDE, CALCIUM CHLORIDE 600; 310; 30; 20 MG/100ML; MG/100ML; MG/100ML; MG/100ML
150 INJECTION, SOLUTION INTRAVENOUS CONTINUOUS
Status: DISCONTINUED | OUTPATIENT
Start: 2020-01-08 | End: 2020-01-11 | Stop reason: HOSPADM

## 2020-01-08 RX ORDER — EPHEDRINE SULFATE/0.9% NACL/PF 50 MG/5 ML
10 SYRINGE (ML) INTRAVENOUS AS NEEDED
Status: DISCONTINUED | OUTPATIENT
Start: 2020-01-08 | End: 2020-01-11 | Stop reason: HOSPADM

## 2020-01-08 RX ORDER — TRANEXAMIC ACID 10 MG/ML
1000 INJECTION, SOLUTION INTRAVENOUS ONCE AS NEEDED
Status: DISCONTINUED | OUTPATIENT
Start: 2020-01-08 | End: 2020-01-11 | Stop reason: HOSPADM

## 2020-01-08 RX ORDER — FENTANYL CITRATE 50 UG/ML
INJECTION, SOLUTION INTRAMUSCULAR; INTRAVENOUS AS NEEDED
Status: DISCONTINUED | OUTPATIENT
Start: 2020-01-08 | End: 2020-01-08 | Stop reason: SURG

## 2020-01-08 RX ORDER — OXYTOCIN/0.9 % SODIUM CHLORIDE 20/1000 ML
500 PLASTIC BAG, INJECTION (ML) INTRAVENOUS ONCE
Status: ACTIVE | OUTPATIENT
Start: 2020-01-08 | End: 2020-01-09

## 2020-01-08 RX ORDER — CLINDAMYCIN PHOSPHATE 900 MG/50ML
INJECTION, SOLUTION INTRAVENOUS AS NEEDED
Status: DISCONTINUED | OUTPATIENT
Start: 2020-01-08 | End: 2020-01-08 | Stop reason: SURG

## 2020-01-08 RX ORDER — CLINDAMYCIN PHOSPHATE 900 MG/50ML
900 INJECTION, SOLUTION INTRAVENOUS
Status: DISCONTINUED | OUTPATIENT
Start: 2020-01-08 | End: 2020-01-09 | Stop reason: HOSPADM

## 2020-01-08 RX ORDER — OXYTOCIN/0.9 % SODIUM CHLORIDE 20/1000 ML
PLASTIC BAG, INJECTION (ML) INTRAVENOUS CONTINUOUS
Status: ACTIVE | OUTPATIENT
Start: 2020-01-08 | End: 2020-01-09

## 2020-01-08 RX ORDER — CARBOPROST TROMETHAMINE 250 UG/ML
250 INJECTION, SOLUTION INTRAMUSCULAR ONCE AS NEEDED
Status: DISCONTINUED | OUTPATIENT
Start: 2020-01-08 | End: 2020-01-11 | Stop reason: HOSPADM

## 2020-01-08 RX ORDER — ONDANSETRON 8 MG/1
TABLET, ORALLY DISINTEGRATING ORAL
Status: DISPENSED
Start: 2020-01-08 | End: 2020-01-09

## 2020-01-08 RX ORDER — MISOPROSTOL 200 UG/1
1000 TABLET ORAL ONCE AS NEEDED
Status: DISCONTINUED | OUTPATIENT
Start: 2020-01-08 | End: 2020-01-11 | Stop reason: HOSPADM

## 2020-01-08 RX ORDER — LIDOCAINE HYDROCHLORIDE AND EPINEPHRINE 15; 5 MG/ML; UG/ML
INJECTION, SOLUTION EPIDURAL
Status: COMPLETED | OUTPATIENT
Start: 2020-01-08 | End: 2020-01-08

## 2020-01-08 RX ORDER — ACETAMINOPHEN 325 MG/1
975 TABLET ORAL
Status: DISCONTINUED | OUTPATIENT
Start: 2020-01-09 | End: 2020-01-11 | Stop reason: HOSPADM

## 2020-01-08 RX ORDER — OXYTOCIN/0.9 % SODIUM CHLORIDE 20/1000 ML
PLASTIC BAG, INJECTION (ML) INTRAVENOUS
Status: DISPENSED
Start: 2020-01-08 | End: 2020-01-09

## 2020-01-08 RX ORDER — METHYLERGONOVINE MALEATE 0.2 MG/ML
200 INJECTION INTRAVENOUS ONCE AS NEEDED
Status: DISCONTINUED | OUTPATIENT
Start: 2020-01-08 | End: 2020-01-11 | Stop reason: HOSPADM

## 2020-01-08 RX ORDER — BUPIVACAINE HYDROCHLORIDE 2.5 MG/ML
INJECTION, SOLUTION EPIDURAL; INFILTRATION; INTRACAUDAL AS NEEDED
Status: DISCONTINUED | OUTPATIENT
Start: 2020-01-08 | End: 2020-01-08 | Stop reason: SURG

## 2020-01-08 RX ORDER — AMOXICILLIN 250 MG
1 CAPSULE ORAL 2 TIMES DAILY
Status: DISCONTINUED | OUTPATIENT
Start: 2020-01-08 | End: 2020-01-11 | Stop reason: HOSPADM

## 2020-01-08 RX ORDER — OXYTOCIN/RINGER'S LACTATE 20/1000 ML
PLASTIC BAG, INJECTION (ML) INTRAVENOUS CONTINUOUS PRN
Status: DISCONTINUED | OUTPATIENT
Start: 2020-01-08 | End: 2020-01-08 | Stop reason: SURG

## 2020-01-08 RX ORDER — ACETAMINOPHEN 650 MG/1
650 SUPPOSITORY RECTAL ONCE
Status: DISCONTINUED | OUTPATIENT
Start: 2020-01-08 | End: 2020-01-09 | Stop reason: HOSPADM

## 2020-01-08 RX ORDER — ACETAMINOPHEN 160 MG/5ML
1000 SUSPENSION ORAL ONCE
Status: DISCONTINUED | OUTPATIENT
Start: 2020-01-08 | End: 2020-01-09 | Stop reason: HOSPADM

## 2020-01-08 RX ORDER — CALCIUM CARBONATE 200(500)MG
500 TABLET,CHEWABLE ORAL EVERY 4 HOURS PRN
Status: DISCONTINUED | OUTPATIENT
Start: 2020-01-08 | End: 2020-01-11 | Stop reason: HOSPADM

## 2020-01-08 RX ORDER — OXYTOCIN 10 [USP'U]/ML
10 INJECTION, SOLUTION INTRAMUSCULAR; INTRAVENOUS ONCE AS NEEDED
Status: DISCONTINUED | OUTPATIENT
Start: 2020-01-08 | End: 2020-01-11 | Stop reason: HOSPADM

## 2020-01-08 RX ORDER — ACETAMINOPHEN 325 MG/1
TABLET ORAL
Status: DISPENSED
Start: 2020-01-08 | End: 2020-01-09

## 2020-01-08 RX ORDER — SODIUM CITRATE AND CITRIC ACID MONOHYDRATE 334; 500 MG/5ML; MG/5ML
30 SOLUTION ORAL ONCE
Status: COMPLETED | OUTPATIENT
Start: 2020-01-08 | End: 2020-01-08

## 2020-01-08 RX ORDER — NYSTATIN 100000 [USP'U]/G
1 OINTMENT TOPICAL AS NEEDED
Status: DISCONTINUED | OUTPATIENT
Start: 2020-01-08 | End: 2020-01-10 | Stop reason: SDUPTHER

## 2020-01-08 RX ORDER — DIBUCAINE 1 %
1 OINTMENT (GRAM) TOPICAL AS NEEDED
Status: DISCONTINUED | OUTPATIENT
Start: 2020-01-08 | End: 2020-01-11 | Stop reason: HOSPADM

## 2020-01-08 RX ORDER — METHYLERGONOVINE MALEATE 0.2 MG/ML
INJECTION INTRAVENOUS AS NEEDED
Status: DISCONTINUED | OUTPATIENT
Start: 2020-01-08 | End: 2020-01-08 | Stop reason: SURG

## 2020-01-08 RX ORDER — BUPIVACAINE HYDROCHLORIDE 2.5 MG/ML
INJECTION, SOLUTION EPIDURAL; INFILTRATION; INTRACAUDAL
Status: COMPLETED
Start: 2020-01-08 | End: 2020-01-08

## 2020-01-08 RX ORDER — ACETAMINOPHEN 325 MG/1
975 TABLET ORAL ONCE
Status: DISCONTINUED | OUTPATIENT
Start: 2020-01-08 | End: 2020-01-09 | Stop reason: HOSPADM

## 2020-01-08 RX ORDER — SODIUM CITRATE AND CITRIC ACID MONOHYDRATE 334; 500 MG/5ML; MG/5ML
SOLUTION ORAL
Status: COMPLETED
Start: 2020-01-08 | End: 2020-01-08

## 2020-01-08 RX ORDER — ACETAMINOPHEN 325 MG/1
650 TABLET ORAL EVERY 4 HOURS PRN
Status: DISCONTINUED | OUTPATIENT
Start: 2020-01-08 | End: 2020-01-11 | Stop reason: HOSPADM

## 2020-01-08 RX ORDER — DIPHENHYDRAMINE HCL 25 MG
25 CAPSULE ORAL EVERY 6 HOURS PRN
Status: DISCONTINUED | OUTPATIENT
Start: 2020-01-08 | End: 2020-01-11 | Stop reason: HOSPADM

## 2020-01-08 RX ORDER — OXYTOCIN/0.9 % SODIUM CHLORIDE 20/1000 ML
125 PLASTIC BAG, INJECTION (ML) INTRAVENOUS CONTINUOUS
Status: ACTIVE | OUTPATIENT
Start: 2020-01-08 | End: 2020-01-09

## 2020-01-08 RX ORDER — KETOROLAC TROMETHAMINE 30 MG/ML
INJECTION, SOLUTION INTRAMUSCULAR; INTRAVENOUS
Status: COMPLETED
Start: 2020-01-08 | End: 2020-01-08

## 2020-01-08 RX ORDER — ACETAMINOPHEN 325 MG/1
TABLET ORAL
Status: COMPLETED
Start: 2020-01-08 | End: 2020-01-08

## 2020-01-08 RX ORDER — ONDANSETRON HYDROCHLORIDE 2 MG/ML
4 INJECTION, SOLUTION INTRAVENOUS EVERY 8 HOURS PRN
Status: DISCONTINUED | OUTPATIENT
Start: 2020-01-08 | End: 2020-01-11 | Stop reason: HOSPADM

## 2020-01-08 RX ORDER — ONDANSETRON 4 MG/1
TABLET, ORALLY DISINTEGRATING ORAL
Status: DISCONTINUED
Start: 2020-01-08 | End: 2020-01-08 | Stop reason: WASHOUT

## 2020-01-08 RX ORDER — ALUMINUM HYDROXIDE, MAGNESIUM HYDROXIDE, AND SIMETHICONE 1200; 120; 1200 MG/30ML; MG/30ML; MG/30ML
30 SUSPENSION ORAL EVERY 4 HOURS PRN
Status: DISCONTINUED | OUTPATIENT
Start: 2020-01-08 | End: 2020-01-11 | Stop reason: HOSPADM

## 2020-01-08 RX ORDER — HYDRALAZINE HYDROCHLORIDE 20 MG/ML
5 INJECTION INTRAMUSCULAR; INTRAVENOUS ONCE
Status: COMPLETED | OUTPATIENT
Start: 2020-01-08 | End: 2020-01-08

## 2020-01-08 RX ORDER — DIPHENHYDRAMINE HCL 50 MG/ML
25 VIAL (ML) INJECTION EVERY 6 HOURS PRN
Status: DISCONTINUED | OUTPATIENT
Start: 2020-01-08 | End: 2020-01-11 | Stop reason: HOSPADM

## 2020-01-08 RX ADMIN — ACETAMINOPHEN 650 MG: 325 TABLET ORAL at 18:32

## 2020-01-08 RX ADMIN — KETOROLAC TROMETHAMINE 30 MG: 30 INJECTION, SOLUTION INTRAMUSCULAR at 21:27

## 2020-01-08 RX ADMIN — Medication 50 ML: at 16:20

## 2020-01-08 RX ADMIN — MORPHINE SULFATE 2.5 MG: 1 INJECTION, SOLUTION EPIDURAL; INTRATHECAL; INTRAVENOUS at 20:24

## 2020-01-08 RX ADMIN — Medication 50 ML: at 02:51

## 2020-01-08 RX ADMIN — Medication 125 ML/HR: at 21:00

## 2020-01-08 RX ADMIN — BUPIVACAINE HYDROCHLORIDE 10 ML: 2.5 INJECTION, SOLUTION EPIDURAL; INFILTRATION; INTRACAUDAL at 09:52

## 2020-01-08 RX ADMIN — SODIUM CHLORIDE, POTASSIUM CHLORIDE, SODIUM LACTATE AND CALCIUM CHLORIDE 125 ML/HR: 600; 310; 30; 20 INJECTION, SOLUTION INTRAVENOUS at 10:30

## 2020-01-08 RX ADMIN — HYDRALAZINE HYDROCHLORIDE 5 MG: 20 INJECTION, SOLUTION INTRAMUSCULAR; INTRAVENOUS at 00:28

## 2020-01-08 RX ADMIN — AZITHROMYCIN DIHYDRATE 500 MG: 500 INJECTION, POWDER, LYOPHILIZED, FOR SOLUTION INTRAVENOUS at 19:45

## 2020-01-08 RX ADMIN — ACETAMINOPHEN 650 MG: 325 TABLET ORAL at 09:07

## 2020-01-08 RX ADMIN — SODIUM CHLORIDE, POTASSIUM CHLORIDE, SODIUM LACTATE AND CALCIUM CHLORIDE 125 ML/HR: 600; 310; 30; 20 INJECTION, SOLUTION INTRAVENOUS at 17:30

## 2020-01-08 RX ADMIN — Medication 50 ML: at 11:35

## 2020-01-08 RX ADMIN — Medication: at 20:17

## 2020-01-08 RX ADMIN — SODIUM CITRATE AND CITRIC ACID MONOHYDRATE 30 ML: 500; 334 SOLUTION ORAL at 19:39

## 2020-01-08 RX ADMIN — CLINDAMYCIN IN 5 PERCENT DEXTROSE 900 MG: 18 INJECTION, SOLUTION INTRAVENOUS at 20:15

## 2020-01-08 RX ADMIN — FENTANYL CITRATE 100 MCG: 50 INJECTION INTRAMUSCULAR; INTRAVENOUS at 03:21

## 2020-01-08 RX ADMIN — LIDOCAINE HYDROCHLORIDE,EPINEPHRINE BITARTRATE 5 ML: 15; .005 INJECTION, SOLUTION EPIDURAL; INFILTRATION; INTRACAUDAL; PERINEURAL at 12:40

## 2020-01-08 RX ADMIN — BUPIVACAINE HYDROCHLORIDE 8 ML: 2.5 INJECTION, SOLUTION EPIDURAL; INFILTRATION; INTRACAUDAL at 03:21

## 2020-01-08 RX ADMIN — SODIUM CITRATE AND CITRIC ACID MONOHYDRATE 30 ML: 334; 500 SOLUTION ORAL at 19:39

## 2020-01-08 RX ADMIN — ONDANSETRON 8 MG: 8 TABLET, ORALLY DISINTEGRATING ORAL at 19:38

## 2020-01-08 RX ADMIN — BUPIVACAINE HYDROCHLORIDE 10 ML: 2.5 INJECTION, SOLUTION EPIDURAL; INFILTRATION; INTRACAUDAL at 12:40

## 2020-01-08 RX ADMIN — Medication: at 20:14

## 2020-01-08 RX ADMIN — Medication 50 ML: at 07:49

## 2020-01-08 RX ADMIN — METHYLERGONOVINE MALEATE 0.2 MG: 0.2 INJECTION, SOLUTION INTRAMUSCULAR; INTRAVENOUS at 20:23

## 2020-01-08 ASSESSMENT — PAIN SCALES - GENERAL: PAIN_LEVEL: 2

## 2020-01-08 ASSESSMENT — ENCOUNTER SYMPTOMS: HEADACHES: 1

## 2020-01-08 NOTE — ANESTHESIA PREPROCEDURE EVALUATION
Anesthesia ROS/MED HX    Anesthesia History - neg  Pulmonary - neg  Neuro/Psych    Headaches  Cardiovascular- neg  Hematological - neg  GI/Hepatic- neg  Musculoskeletal- neg  Renal Disease- neg  Endo/Other- neg  Body Habitus: Overweight      Relevant Problems   No relevant active problems       Physical Exam    Airway   Mallampati: III   TM distance: >3 FB   Neck ROM: full  Cardiovascular - normal   Rhythm: regular   Rate: normalPulmonary - normal   clear to auscultation  Other Findings   Back - neg   landmarks identified    Dental - normal        Anesthesia Plan    Plan: epidural    Technique: epidural     Lines and Monitors: PIV     Airway: natural airway / supplemental oxygen   ASA 2 - emergent  Anesthetic plan and risks discussed with: patient  Postop Plan:   Patient Disposition: inpatient floor planned admission   Pain Management: IV analgesics

## 2020-01-08 NOTE — ANESTHESIA PROCEDURE NOTES
Epidural Block    Patient location during procedure: OB  Start time: 1/7/2020 7:28 PM  End time: 1/7/2020 7:52 PM  Reason for block: labor analgesia requested by patient and obstetrician  Staffing  Anesthesiologist: Cayden Torres MD  Performed: anesthesiologist   Preanesthetic Checklist  Completed: patient identified, surgical consent, pre-op evaluation, timeout performed, IV checked, risks and benefits discussed, monitors and equipment checked, anesthesia consent given and sterile field maintained during procedure  Epidural  Patient position: sitting  Prep: Betadine  Patient monitoring: heart rate, continuous pulse ox and blood pressure  Approach: midline  Location: lumbar (1-5)  Injection technique: JOCELYN air  Needle  Needle type: Tuohy   Needle gauge: 17 G  Needle length: 3.5 in  Needle insertion depth: 9 cm  Catheter type: Single orifice  Catheter size: 20 G  Catheter at skin depth: 14 cm  Test dose: negative  Assessment  Sensory level: T10

## 2020-01-08 NOTE — PROGRESS NOTES
Labor and Delivery Progress Note    Subjective     S: no c/o    Objective     Vital Signs:  Vitals:    20 0729   BP: 116/62   Pulse: 77   Resp:    Temp:    SpO2:        Vital Signs for the last 24 hours:  Temp:  [36.8 °C (98.3 °F)-36.9 °C (98.4 °F)] 36.8 °C (98.3 °F)  Heart Rate:  [56-96] 77  Resp:  [14-18] 18  BP: (106-176)/(55-94) 116/62     Fetal Monitoring:  FHR Baseline: 135  FHR Variability: moderate  FHR Accelerations: present  FHR Decelerations: absent     Contraction Frequency: q2-3  Pitocin: 2 mu/min      Latest cervical exam:  Cervical Dilation (cm): 4  Cervical Effacement: 0  Fetal Station: -3  Method: sterile exam per physician (20 0710)      Assessment/Plan     Luna Felix is a 29 y.o. female  at 40w5d admitted with induction of labor elective    Labor: continue pitocin  FHR: Category I      Nichole Tatum MD

## 2020-01-08 NOTE — PROGRESS NOTES
Labor and Delivery Progress Note    Subjective     S: no c/o    Objective     Vital Signs:  Vitals:    20 1545   BP: 140/78   Pulse: 68   Resp:    Temp:    SpO2:        Vital Signs for the last 24 hours:  Temp:  [36.7 °C (98.1 °F)-37.1 °C (98.7 °F)] 37.1 °C (98.7 °F)  Heart Rate:  [56-96] 68  Resp:  [14-18] 18  BP: ()/(52-94) 140/78     Fetal Monitoring:  FHR Baseline: 140  FHR Variability: moderate  FHR Accelerations: present  FHR Decelerations: absent     Contraction Frequency: q2   Pitocin: 13 mu/min      Latest cervical exam:  Cervical Dilation (cm): 4-5  Cervical Effacement: 80  Fetal Station: -2  Method: sterile exam per physician (20 1635)      Assessment/Plan     Luna Felix is a 29 y.o. female  at 40w5d admitted with induction of labor elective     Labor: continue pitocin.  Discussed with patient guidelines for failed induction.   FHR: Category I      Nichole Tatum MD

## 2020-01-08 NOTE — PROGRESS NOTES
BP has spiked to 176/83,  161/84.  C/o minimal HA, no visual changes.  Pt c/o right  hip pain despite the epidural.  Pt given a bolus of epidural and position change.  Hydralazine 5 mg IV x 1 given.  BP now 130s/60's  FHR cat 1  toco Q 1-2 mins  CBC, CMP, uric acid and urine P/C pending.  Will monitor closely.

## 2020-01-08 NOTE — NURSING NOTE
Dr. Forrest made aware of FHT, strip was flat with recurrent lates for 15 minutes. Pt. was repositioned to left side, 02, fluid bolus. Strip did not approve. Pitocin turned off. Strip improved, reactive. Dr. Forrest requests pitocin to be turned back on starting at 1 m/u.

## 2020-01-08 NOTE — PROGRESS NOTES
Pt comfortable with epidural.  AF  /57  SROM at 0500.  Cervical hernandez was removed.  Pit at 1  Cervix is now 4/90/-3  FHR - cat. 1  toco - Q 2mins, but not strong  Will gradually increase pitocin.

## 2020-01-08 NOTE — PROGRESS NOTES
Labor and Delivery Progress Note    Subjective     S: increased discomfort     Objective     Vital Signs:  Vitals:    20 1142   BP: (!) 118/57   Pulse: 63   Resp:    Temp:    SpO2:        Vital Signs for the last 24 hours:  Temp:  [36.7 °C (98.1 °F)-36.9 °C (98.4 °F)] 36.8 °C (98.3 °F)  Heart Rate:  [56-96] 63  Resp:  [14-18] 18  BP: (106-176)/(55-94) 118/57     Fetal Monitoring:  FHR Baseline: 140  FHR Variability: moderate  FHR Accelerations: present  FHR Decelerations: absent     Contraction Frequency: q2-4  Pitocin: 7 mu/min      Latest cervical exam:  Cervical Dilation (cm): 4  Cervical Effacement: 80  Fetal Station: -2  Method: sterile exam per physician (20 1129)      Assessment/Plan     Luna Felix is a 29 y.o. female  at 40w5d admitted with induction of labor elective     Labor: continue pitocin   FHR: Category I      Nichole Tatum MD

## 2020-01-08 NOTE — PROGRESS NOTES
Labor and Delivery Progress Note    Subjective     S: much more comfortable s/p replaced epidural.      Objective     Vital Signs:  Vitals:    20 1417   BP: (!) 112/57   Pulse: 62   Resp:    Temp:    SpO2:        Vital Signs for the last 24 hours:  Temp:  [36.7 °C (98.1 °F)-36.9 °C (98.4 °F)] 36.8 °C (98.3 °F)  Heart Rate:  [56-96] 62  Resp:  [14-18] 18  BP: ()/(52-94) 112/57     Fetal Monitoring:  FHR Baseline: 140  FHR Variability: moderate  FHR Accelerations: present  FHR Decelerations: present late decels post epidural, now resolved with ephedrine and BP increased     Contraction Frequency: q3  Pitocin: 10 mu/min        Latest cervical exam:  Cervical Dilation (cm): 4-5  Cervical Effacement: 80  Fetal Station: -2  Method: sterile exam per physician (20 1421)      Assessment/Plan     Luna Felix is a 29 y.o. female  at 40w5d admitted with induction of labor elective     Labor: continue pitocin induction  FHR: Category I      Nichole Tatum MD

## 2020-01-08 NOTE — PROGRESS NOTES
Pt is comfortable with epidural.  FHR -cat.1  toco-Q 1-2mins,  Cervix - loose 1/long/-3  Mcnair bulb placed. 60 cc saline in both the vaginal and the uterine bulb  Will add pitocin up to 4 mU/min overnight.  Keep in for 12 hours - remove if SROM or NRFHRT

## 2020-01-08 NOTE — NURSING NOTE
patient currently reporting 8/10 pain, bolused epidural, repositioned with no relief. Dr. Torres made aware, requested to notify GEORGE Méndez. Dev made aware.

## 2020-01-08 NOTE — ANESTHESIA PROCEDURE NOTES
Epidural Block    Patient location during procedure: OB  Start time: 1/8/2020 12:20 PM  End time: 1/8/2020 12:40 PM  Reason for block: labor analgesia requested by patient and obstetrician  Staffing  Anesthesiologist: Santino Davis MD  Performed: anesthesiologist   Preanesthetic Checklist  Completed: patient identified, surgical consent, pre-op evaluation, timeout performed, IV checked, risks and benefits discussed, monitors and equipment checked and sterile field maintained during procedure  Epidural  Patient position: sitting  Prep: ChloraPrep and site prepped and draped  Patient monitoring: heart rate, cardiac monitor, continuous pulse ox and blood pressure  Approach: midline  Location: lumbar (1-5)  Needle  Needle type: Tuohy   Needle gauge: 17 G  Needle length: 3.5 in  Needle insertion depth: 7 cm  Catheter type: Single orifice  Catheter size: 19 G  Catheter at skin depth: 12 cm  Test dose: negative and lidocaine 1.5% with epinephrine 1-to-200,000  Additional Notes  Procedure well tolerated. Vital signs stable. No paresthesia.  Analgesia satisfactory. Patients nurse to notify anesthesiologist if hemodynamically unstable.    Medications Administered - 1/8/2020 12:40 PM   Bupivacaine PF (MARCAINE) injection 0.25 %, 10 mL  lidocaine 1.5%-EPINEPHrine 1:200,000 PF (XYLOCAINE W/EPI) injection, 5 mL

## 2020-01-09 LAB
ERYTHROCYTE [DISTWIDTH] IN BLOOD BY AUTOMATED COUNT: 14.8 % (ref 11.7–14.4)
HCT VFR BLDCO AUTO: 29.5 %
HGB BLD-MCNC: 9.5 G/DL (ref 11.8–15.7)
MCH RBC QN AUTO: 28.4 PG (ref 28–33.2)
MCHC RBC AUTO-ENTMCNC: 32.2 G/DL (ref 32.2–35.5)
MCV RBC AUTO: 88.3 FL (ref 83–98)
PDW BLD AUTO: 14 FL (ref 9.4–12.3)
PLATELET # BLD AUTO: 168 K/UL
RBC # BLD AUTO: 3.34 M/UL (ref 3.93–5.22)
WBC # BLD AUTO: 18.61 K/UL

## 2020-01-09 PROCEDURE — 63600000 HC DRUGS/DETAIL CODE: Performed by: OBSTETRICS & GYNECOLOGY

## 2020-01-09 PROCEDURE — 85027 COMPLETE CBC AUTOMATED: CPT | Performed by: OBSTETRICS & GYNECOLOGY

## 2020-01-09 PROCEDURE — 12000000 HC ROOM AND CARE MED/SURG

## 2020-01-09 PROCEDURE — 63700000 HC SELF-ADMINISTRABLE DRUG: Performed by: OBSTETRICS & GYNECOLOGY

## 2020-01-09 PROCEDURE — 36415 COLL VENOUS BLD VENIPUNCTURE: CPT | Performed by: OBSTETRICS & GYNECOLOGY

## 2020-01-09 RX ADMIN — KETOROLAC TROMETHAMINE 30 MG: 30 INJECTION, SOLUTION INTRAMUSCULAR at 04:27

## 2020-01-09 RX ADMIN — KETOROLAC TROMETHAMINE 30 MG: 30 INJECTION, SOLUTION INTRAMUSCULAR at 16:42

## 2020-01-09 RX ADMIN — SENNOSIDES AND DOCUSATE SODIUM 1 TABLET: 8.6; 5 TABLET ORAL at 19:39

## 2020-01-09 RX ADMIN — LEVOTHYROXINE SODIUM 125 MCG: 0.03 TABLET ORAL at 06:49

## 2020-01-09 RX ADMIN — SENNOSIDES AND DOCUSATE SODIUM 1 TABLET: 8.6; 5 TABLET ORAL at 08:53

## 2020-01-09 RX ADMIN — KETOROLAC TROMETHAMINE 30 MG: 30 INJECTION, SOLUTION INTRAMUSCULAR at 22:32

## 2020-01-09 RX ADMIN — KETOROLAC TROMETHAMINE 30 MG: 30 INJECTION, SOLUTION INTRAMUSCULAR at 10:44

## 2020-01-09 RX ADMIN — PRENATAL VITAMINS-IRON FUMARATE 27 MG IRON-FOLIC ACID 0.8 MG TABLET 1 TABLET: at 08:53

## 2020-01-09 RX ADMIN — ACETAMINOPHEN 975 MG: 325 TABLET ORAL at 16:42

## 2020-01-09 RX ADMIN — ACETAMINOPHEN 975 MG: 325 TABLET ORAL at 22:33

## 2020-01-09 RX ADMIN — ACETAMINOPHEN 975 MG: 325 TABLET ORAL at 10:45

## 2020-01-09 RX ADMIN — ACETAMINOPHEN 975 MG: 325 TABLET ORAL at 04:28

## 2020-01-09 NOTE — ANESTHESIA POSTPROCEDURE EVALUATION
Patient: Luna Felix    Procedure Summary     Date:  20 Room / Location:   L&D 1 / [facility] L[facility] OR    Anesthesia Start:   Anesthesia Stop:  20    Procedure:   SECTION (N/A ) Diagnosis:       Failed medical induction of labor      (Failed medical induction of labor [O61.0])    Surgeon:  Nava Forrest MD Responsible Provider:  Cayden Torres MD    Anesthesia Type:  epidural ASA Status:  2 - Emergent          Anesthesia Type: epidural  PACU Vitals     No data found in the last 10 encounters.            Anesthesia Post Evaluation    Pain score: 2  Pain management: adequate  Patient location during evaluation: PACU  Patient participation: complete - patient participated  Level of consciousness: awake and alert  Cardiovascular status: acceptable  Airway Patency: adequate  Respiratory status: acceptable  Hydration status: acceptable  Anesthetic complications: no

## 2020-01-09 NOTE — LACTATION NOTE
P1.  I assisted with breastfeeding. Baby was sleepy so we reviewed waking strategies and how to gently stimulate baby at the breast to encourage active sucking. I showed mom how to compress breast like sandwich as well as how to check to make sure lower lip was flanged out in order to achieve deep latch. Baby latched well without nipple shield use. Baby had audible swallows and good jaw movement. Mom easily hand expresses colostrum. We reviewed how to know baby is transferring milk, breastfeeding frequency, expected infant I/O's, different breastfeeding positions, and how to identify infant feeding cues. Lactation team will follow.

## 2020-01-09 NOTE — PROGRESS NOTES
Obstetrics Postpartum Progress Note    Events  No acute events overnight.    Subjective  Pain: no  Bleeding: lochia minimal  Diet: taking regular diet  Voiding: hernandez discontinued, awaiting spontaneous void  Bowel: no flatus  Ambulating: as tolerated  Feeding: plans to breastfeed    Vitals  Temp:  [36.3 °C (97.3 °F)-38.1 °C (100.6 °F)] 36.3 °C (97.3 °F)  Heart Rate:  [] 81  Resp:  [16] 16  BP: ()/(52-85) 106/61    I&O    Intake/Output Summary (Last 24 hours) at 2020 1019  Last data filed at 2020 0000  Gross per 24 hour   Intake 2000 ml   Output 1900 ml   Net 100 ml       Physical Exam  General: well  Abdomen: soft, nondistended, non-tender  Fundus: firm and at umbilicus  Incision: healing well  Extremities: symmetric    Labs  Labs Reviewed:  Lab Results   Component Value Date    ABO O 2020    LABRH Positive 2020        CBC Results       20                    0629 0037 2034         WBC 18.61 12.06 10.31         RBC 3.34 4.03 3.91         HGB 9.5 11.5 11.2         HCT 29.5 34.4 33.4         MCV 88.3 85.4 85.4         MCH 28.4 28.5 28.6         MCHC 32.2 33.4 33.5          203 210         Comment for HGB at 0629 on 20:  REVIEWED BY TECHNOLOGIST          Assessment/Plan     Problem-based Assessment and Plan    Luna Felix is a 29 y.o.  postop day 1 s/p , Low Transverse .    1) Post-op: appropriate course, continue routine care, awaiting void and flatus  2) Hemodynamics: stable, good UOP  3) VTE: low risk, ambulation  4) Rh/immunization: not indicated  5) Anticipate discharge home on post-op day # 3  6) Circ consent obtained, glucose low and poor feeding per nursing and lactation delay requested until this afternoon or possibly tomorrow, depending on feeding status    Anjelica Murphy MD

## 2020-01-09 NOTE — PROGRESS NOTES
Patient: Luna Felix  Procedure(s):   SECTION  Location: [facility] L&[facility] OR    Last vitals:   Vitals:    20 0756   BP: 106/61   Pulse: 81   Resp: 16   Temp: 36.3 °C (97.3 °F)   SpO2:      Level of consciousness: Awake, Alert, Oriented  Post-anesthesia pain: Adequate analgesia  Anesthetic complications: None  Nausea and Vomiting Controled: Yes  Hydration: Adequate  Cardiovascular Function: Stable  Neuro Exam: WNL  Respiration: WNL  Pain is well controlled after spinal preservative free morphine administration and additional IV/PO meds:  Continue 24 hours observation after preservative free morphine administration:

## 2020-01-09 NOTE — OP NOTE
OB  Delivery OP Note    Date of Procedure: 2020  Patient:Luna Felix  :1990    Procedure:     SECTION  CPT(R) Code:  54828 - [place] FULL ROUT OBSTE CARE, DELIV    Review the Delivery Report for details.      Details    Pre-Op Diagnosis: 1. 29 y.o.  Intrauterine pregnancy at 40w5d  2. Failed induction   Post-Op Diagnosis: 1. same   Procedure: Primary  section  via low transverse uterine incision and Pfannensteil skin incision.   Anesthesia: Epidural    Findings: Normal uterus, tubes, and ovaries.   EBL  70cc   Complications: None     Specimen Information:  * No specimens in log *  Drains: Mcnair draining clear    Staff:  Surgeon(s):  Nava Forrest MD  Anesthesiologist: Cayden Torres MD; Santino Davis MD; Sarwat Alonzo MD   Circulator: Catherine Williamson RN  Scrub Person: Destiny Zuleta  Baby Nurse: Nohemy Macias RN  Other Staff:  ;NOHEMY MACIAS;CATHERINE WILLIAMSON  Delivery Assist;Delivery Nurse;Circulator     INFANT INFORMATION  Time of Birth:8:12 PM   Presentation: Vertex   Position:  ,  ,  ,    Cord: 3 vessels ,Complications:Nuchal;Knot   [place] Sex: male   [place] Weight: 3.697 kg (8 lb 2.4 oz)      1 Minute 5 Minute 10 Minute   Apgar Totals: 8    9            Information for the patient's :  Weston Felix [[id number]]      Cord Gas     None          Informed Consent:  An informed consent was obtained.    Procedure Details:  The patient was taken to the operating room where Epidural  anesthesia was bolused and found to be adequate. Antibiotics were given for infection prophylaxis. The patient was prepped and draped in the normal sterile fashion.  A timeout was performed.  A Pfannenstiel skin incision was made with the scalpel. The incision was carried down to the fascia using the bovie. The fascia was incised and this was extended laterally with the bovie. The fascia was grasped with Kocher clamps and the underlying rectus muscle  was dissected off.  The rectus muscles were  bluntly. The peritoneum was identified and entered bluntly. The peritoneum was dissected to allow for adequate visualization.    The bladder blade was inserted. The vesicouterine peritoneum was identified and a bladder flap created sharply. The lower uterine segment was then incised with a transverse  incision and was extended bluntly. The amniotic sac was ruptured and Clear  fluid noted. A double nuchal cord was taken down and a true knot was observed.  The bladder blade was removed and the infant's head was delivered atraumatically using the usual maneuvers. The remainder of the infant was delivered, a spontaneous cry was heard, and the infant appeared to be moving all 4 extremities. The cord clamped and cut, and the baby was handed off to the awaiting clinicians and neonatology staff.  The placenta was removed manually. The uterus was then exteriorized and cleared of all clots and debris. The hysterotomy was repaired with #1 Chromic in a running locked fashion.Despite 40 units of pitocin in 800 cc IVF, the pt's uterus was still boggy, therefore, Methergine 0.2 mg IM x 1 given with good results.  A second imbricating layer of the same suture was placed and good hemostasis observed. The ovaries and tubes appeared normal bilaterally. The uterus was then returned to the abdomen. The uterine incision was reinspected and found to be hemostatic. The uterus was observed in situ for 5 minutes and the fundus remained firm.  The gutters were inspected and cleared of all debris. The fascia was then reapproximated with a running suture of 0 Vicryl. The adipose layer was reapproximated with 2-0 plain gut suture.  The skin was closed with 4-0 Monocryl.    The patient tolerated the procedure well. The sponge, instrument, and needle counts were correct and the patient was taken to recovery in stable condition.    Attending Attestation: I was present and scrubbed for the entire  procedure.    Nava Forrest MD

## 2020-01-09 NOTE — PLAN OF CARE
Problem: Adult Inpatient Plan of Care  Goal: Plan of Care Review  Outcome: Progressing  Flowsheets  Taken 1/9/2020 9350  Progress: improving  Outcome Summary: vss, bonding well with baby, pt denies pain.  Taken 1/8/2020 6909  Plan of Care Reviewed With: patient;spouse

## 2020-01-09 NOTE — PROGRESS NOTES
Pt's cervix has not changed since was exam 12  hours ago.    Still 4/90/-3  FHR - cat. 1  Temp is now 100.6  Tylenol given 45 mins ago.  Pt has an allergy to Ceclor - rash   Will give Gent/Clinda  and Azithro IV pre op  Will take pt for a primary c/s for failed induction/FTP

## 2020-01-09 NOTE — PLAN OF CARE
Problem: Adult Inpatient Plan of Care  Goal: Plan of Care Review  1/9/2020 1720 by Ana Dominguez RN  Flowsheets  Taken 1/9/2020 0456 by Nohemy Mcgowan RN  Progress: improving  Taken 1/9/2020 0920 by Ana Dominguez, RN  Plan of Care Reviewed With: patient;spouse  Taken 1/9/2020 1720 by Ana Dominguez RN  Outcome Summary: breastfeeding with minimal assistance

## 2020-01-10 PROCEDURE — 63600000 HC DRUGS/DETAIL CODE: Performed by: OBSTETRICS & GYNECOLOGY

## 2020-01-10 PROCEDURE — 63700000 HC SELF-ADMINISTRABLE DRUG: Performed by: OBSTETRICS & GYNECOLOGY

## 2020-01-10 PROCEDURE — 12000000 HC ROOM AND CARE MED/SURG

## 2020-01-10 RX ORDER — ACETAMINOPHEN 325 MG/1
650 TABLET ORAL EVERY 4 HOURS PRN
Qty: 90 TABLET | Refills: 0 | Status: SHIPPED | OUTPATIENT
Start: 2020-01-10 | End: 2020-02-09

## 2020-01-10 RX ORDER — OXYCODONE HYDROCHLORIDE 5 MG/1
5 TABLET ORAL EVERY 4 HOURS PRN
Qty: 5 TABLET | Refills: 0 | Status: SHIPPED | OUTPATIENT
Start: 2020-01-10 | End: 2020-01-15

## 2020-01-10 RX ORDER — IBUPROFEN 600 MG/1
600 TABLET ORAL EVERY 6 HOURS
Status: DISCONTINUED | OUTPATIENT
Start: 2020-01-10 | End: 2020-01-11 | Stop reason: HOSPADM

## 2020-01-10 RX ORDER — IBUPROFEN 200 MG
200 TABLET ORAL EVERY 6 HOURS
Qty: 40 TABLET | Refills: 0 | Status: SHIPPED | OUTPATIENT
Start: 2020-01-10 | End: 2021-10-04

## 2020-01-10 RX ORDER — NYSTATIN 100000 [USP'U]/G
OINTMENT TOPICAL 2 TIMES DAILY
Status: DISCONTINUED | OUTPATIENT
Start: 2020-01-10 | End: 2020-01-11 | Stop reason: HOSPADM

## 2020-01-10 RX ADMIN — ACETAMINOPHEN 975 MG: 325 TABLET ORAL at 17:18

## 2020-01-10 RX ADMIN — ACETAMINOPHEN 975 MG: 325 TABLET ORAL at 04:22

## 2020-01-10 RX ADMIN — IBUPROFEN 600 MG: 600 TABLET, FILM COATED ORAL at 23:02

## 2020-01-10 RX ADMIN — KETOROLAC TROMETHAMINE 30 MG: 30 INJECTION, SOLUTION INTRAMUSCULAR at 04:28

## 2020-01-10 RX ADMIN — PRENATAL VITAMINS-IRON FUMARATE 27 MG IRON-FOLIC ACID 0.8 MG TABLET 1 TABLET: at 08:36

## 2020-01-10 RX ADMIN — LEVOTHYROXINE SODIUM 125 MCG: 0.03 TABLET ORAL at 06:15

## 2020-01-10 RX ADMIN — ACETAMINOPHEN 975 MG: 325 TABLET ORAL at 11:06

## 2020-01-10 RX ADMIN — SENNOSIDES AND DOCUSATE SODIUM 1 TABLET: 8.6; 5 TABLET ORAL at 20:14

## 2020-01-10 RX ADMIN — ACETAMINOPHEN 975 MG: 325 TABLET ORAL at 23:03

## 2020-01-10 RX ADMIN — IBUPROFEN 600 MG: 600 TABLET, FILM COATED ORAL at 17:18

## 2020-01-10 RX ADMIN — NYSTATIN: 100000 OINTMENT TOPICAL at 16:06

## 2020-01-10 RX ADMIN — SENNOSIDES AND DOCUSATE SODIUM 1 TABLET: 8.6; 5 TABLET ORAL at 08:36

## 2020-01-10 NOTE — PROGRESS NOTES
2Obstetrics Postpartum Progress Note    Events  No acute events overnight.    Subjective  Pain: no  Diet: taking regular diet  Voiding: without difficulty  Bowel: passing flatus  Ambulating: as tolerated    Vitals  Temp:  [36.3 °C (97.4 °F)-36.8 °C (98.2 °F)] 36.8 °C (98.2 °F)  Heart Rate:  [75-79] 75  Resp:  [16-18] 16  BP: (106-116)/(57-64) 106/58    I&O  No intake or output data in the 24 hours ending 01/10/20 1510    Physical Exam  General: well  Abdomen: soft, nondistended, non-tender  Fundus: below umbilicus  Incision: dressing clean, dry, intact  Perineum: deferred  Extremities: symmetric    Labs  Labs Reviewed:  No results found for: ABO, LABRH       Assessment/Plan   Problem-based Assessment and Plan    Luna Felix is a 29 y.o.  postop day 2 s/p , Low Transverse .    1. Vital Signs: stable  2. Hemodynamics: stable  3. Pain: controlled  4. VTE Assessment: Early Ambulation  5. Postpartum care: meeting all goals     Angely Patiño MD

## 2020-01-10 NOTE — DISCHARGE SUMMARY
Inpatient Discharge Summary    BRIEF OVERVIEW  Discharge Provider: Angely Patiño MD  Primary care provider: Luna Berrios PA C    Admission Date: 2020     Discharge Date: 2020    Discharge Diagnosis  Postpartum state  Failed induction  Post op primary LTCS    Discharge Disposition  Home    Discharge Medications   Luna Felix   Home Medication Instructions YESSENIA:[id number]    Printed on:01/10/20 3684   Medication Information                      acetaminophen (TYLENOL) 325 mg tablet  Take 2 tablets (650 mg total) by mouth every 4 (four) hours as needed for moderate pain.             ibuprofen (MOTRIN) 200 mg tablet  Take 1 tablet (200 mg total) by mouth every 6 (six) hours for 10 days.             levothyroxine (SYNTHROID) 125 mcg tablet  Take 125 mcg by mouth daily.             oxyCODONE (ROXICODONE) 5 mg immediate release tablet  Take 1 tablet (5 mg total) by mouth every 4 (four) hours as needed for severe pain for up to 5 days.                 Outpatient Follow-Up  Postpartum followup in 6 weeks        DETAILS OF HOSPITAL STAY      Luna Felix is a  who presented to L&D and had Labor/Delivery:  (Failure to progress/failed induction).  She had a routine postpartum course and was discharged in stable condition. Discharge instructions were reviewed in detail.     She will follow up in 4 weeks.     Relevant consults: none  Relevant labs: none

## 2020-01-10 NOTE — LACTATION NOTE
P1. Baby was on 15 plan for sugar protocol. Low blood sugars have since resolved so now baby will be decreasing amount of formula supplementation. Despite sleepiness, baby latched well to both breasts, Lactation team will follow.

## 2020-01-10 NOTE — PLAN OF CARE
Problem: Adult Inpatient Plan of Care  Goal: Plan of Care Review  Outcome: Progressing  Flowsheets (Taken 1/10/2020 0515)  Progress: improving  Plan of Care Reviewed With: patient; spouse  Outcome Summary: VSS.  Performing self care independenty.  Pt. requested IV to be discontinued.  Patient taking PO meds for pain control.

## 2020-01-11 VITALS
HEART RATE: 84 BPM | TEMPERATURE: 98.1 F | BODY MASS INDEX: 35.02 KG/M2 | RESPIRATION RATE: 16 BRPM | OXYGEN SATURATION: 98 % | DIASTOLIC BLOOD PRESSURE: 58 MMHG | WEIGHT: 217 LBS | SYSTOLIC BLOOD PRESSURE: 126 MMHG

## 2020-01-11 PROCEDURE — 63700000 HC SELF-ADMINISTRABLE DRUG: Performed by: OBSTETRICS & GYNECOLOGY

## 2020-01-11 RX ADMIN — IBUPROFEN 600 MG: 600 TABLET, FILM COATED ORAL at 05:20

## 2020-01-11 RX ADMIN — ACETAMINOPHEN 975 MG: 325 TABLET ORAL at 05:20

## 2020-01-11 RX ADMIN — LEVOTHYROXINE SODIUM 125 MCG: 0.03 TABLET ORAL at 05:20

## 2020-01-11 RX ADMIN — PRENATAL VITAMINS-IRON FUMARATE 27 MG IRON-FOLIC ACID 0.8 MG TABLET 1 TABLET: at 09:07

## 2020-01-11 RX ADMIN — SENNOSIDES AND DOCUSATE SODIUM 1 TABLET: 8.6; 5 TABLET ORAL at 09:08

## 2020-01-11 NOTE — PROGRESS NOTES
Obstetrics Postpartum Progress Note  POD#3 s/p primary LTCS for arrest of dilation    Events  No acute events overnight.    Subjective  Pain: no  Bleeding: lochia minimal  Diet: taking regular diet  Voiding: without difficulty  Bowel: passing flatus  Ambulating: as tolerated  Feeding: plans to breastfeed    Vitals  Temp:  [36.7 °C (98.1 °F)-36.8 °C (98.3 °F)] 36.7 °C (98.1 °F)  Heart Rate:  [77-84] 84  Resp:  [16] 16  BP: (126)/(58-77) 126/58    Physical Exam  General: well  Abdomen: soft, nondistended, non-tender  Fundus: firm, at umbilicus and nontender  Incision: healing well  Extremities: symmetric, 1+ edema    Assessment/Plan   Problem-based Assessment and Plan    Luna Felix is a 29 y.o.  postop day 3 s/p , Low Transverse .    Appropriate postpartum course. Discharge home today.  Reviewed all discharge instructions, precautions, follow-up appointments, discharge medications. All questions answered.    Priscilla Chavarria MD  2020  8:50 AM

## 2020-01-11 NOTE — PLAN OF CARE
Problem: Adult Inpatient Plan of Care  Goal: Plan of Care Review  Outcome: Progressing  Flowsheets  Taken 1/11/2020 0626  Progress: improving  Outcome Summary: vital signs WDL; pain managed well; independent with self and baby care; independent with breastfeeding  Taken 1/11/2020 0100  Plan of Care Reviewed With: patient;spouse

## 2020-01-11 NOTE — LACTATION NOTE
Mom and baby doing well, reviewed BF resources for home and recommended outpatient lactation follow up.

## 2020-10-19 ENCOUNTER — TRANSCRIBE ORDERS (OUTPATIENT)
Dept: SCHEDULING | Age: 30
End: 2020-10-19

## 2020-10-19 DIAGNOSIS — N39.0 URINARY TRACT INFECTION, SITE NOT SPECIFIED: Primary | ICD-10-CM

## 2020-10-26 ENCOUNTER — HOSPITAL ENCOUNTER (OUTPATIENT)
Dept: RADIOLOGY | Facility: HOSPITAL | Age: 30
Discharge: HOME | End: 2020-10-26
Attending: NURSE PRACTITIONER
Payer: COMMERCIAL

## 2020-10-26 DIAGNOSIS — N39.0 URINARY TRACT INFECTION, SITE NOT SPECIFIED: ICD-10-CM

## 2020-10-26 PROCEDURE — 76770 US EXAM ABDO BACK WALL COMP: CPT

## 2020-12-08 ENCOUNTER — TRANSCRIBE ORDERS (OUTPATIENT)
Dept: SCHEDULING | Age: 30
End: 2020-12-08

## 2020-12-08 DIAGNOSIS — M54.50 LOW BACK PAIN: Primary | ICD-10-CM

## 2020-12-09 ENCOUNTER — HOSPITAL ENCOUNTER (OUTPATIENT)
Dept: RADIOLOGY | Facility: CLINIC | Age: 30
Discharge: HOME | End: 2020-12-09
Attending: CHIROPRACTOR
Payer: COMMERCIAL

## 2020-12-09 DIAGNOSIS — M54.50 LOW BACK PAIN: ICD-10-CM

## 2020-12-09 PROCEDURE — 72100 X-RAY EXAM L-S SPINE 2/3 VWS: CPT

## 2020-12-09 PROCEDURE — 73502 X-RAY EXAM HIP UNI 2-3 VIEWS: CPT | Mod: LT

## 2021-04-14 DIAGNOSIS — Z23 ENCOUNTER FOR IMMUNIZATION: ICD-10-CM

## 2021-05-12 ENCOUNTER — TRANSCRIBE ORDERS (OUTPATIENT)
Dept: REGISTRATION | Facility: CLINIC | Age: 31
End: 2021-05-12

## 2021-05-12 ENCOUNTER — HOSPITAL ENCOUNTER (OUTPATIENT)
Dept: RADIOLOGY | Facility: CLINIC | Age: 31
Discharge: HOME | End: 2021-05-12
Attending: FAMILY MEDICINE
Payer: COMMERCIAL

## 2021-05-12 DIAGNOSIS — M54.50 LOW BACK PAIN: ICD-10-CM

## 2021-05-12 DIAGNOSIS — M54.50 LOW BACK PAIN: Primary | ICD-10-CM

## 2021-05-12 PROCEDURE — 72110 X-RAY EXAM L-2 SPINE 4/>VWS: CPT

## 2021-08-23 ENCOUNTER — TRANSCRIBE ORDERS (OUTPATIENT)
Dept: SCHEDULING | Age: 31
End: 2021-08-23

## 2021-08-23 DIAGNOSIS — Z36.87 ENCOUNTER FOR ANTENATAL SCREENING FOR UNCERTAIN DATES: Primary | ICD-10-CM

## 2021-09-01 ENCOUNTER — HOSPITAL ENCOUNTER (OUTPATIENT)
Dept: PERINATAL CARE | Facility: HOSPITAL | Age: 31
Discharge: HOME | End: 2021-09-01
Attending: OBSTETRICS & GYNECOLOGY
Payer: COMMERCIAL

## 2021-09-01 DIAGNOSIS — O36.80X0 ENCOUNTER TO DETERMINE FETAL VIABILITY OF PREGNANCY, SINGLE OR UNSPECIFIED FETUS: ICD-10-CM

## 2021-09-01 DIAGNOSIS — Z3A.01 LESS THAN 8 WEEKS GESTATION OF PREGNANCY: Primary | ICD-10-CM

## 2021-09-01 PROCEDURE — 76801 OB US < 14 WKS SINGLE FETUS: CPT

## 2021-09-07 ENCOUNTER — TRANSCRIBE ORDERS (OUTPATIENT)
Dept: SCHEDULING | Age: 31
End: 2021-09-07
Payer: COMMERCIAL

## 2021-09-07 DIAGNOSIS — Z36.82 ENCOUNTER FOR ANTENATAL SCREENING FOR NUCHAL TRANSLUCENCY: Primary | ICD-10-CM

## 2021-10-04 ENCOUNTER — HOSPITAL ENCOUNTER (OUTPATIENT)
Dept: PERINATAL CARE | Facility: HOSPITAL | Age: 31
Discharge: HOME | End: 2021-10-04
Attending: OBSTETRICS & GYNECOLOGY
Payer: COMMERCIAL

## 2021-10-04 DIAGNOSIS — Z3A.12 12 WEEKS GESTATION OF PREGNANCY: ICD-10-CM

## 2021-10-04 DIAGNOSIS — Z36.3 ANTENATAL SCREENING FOR MALFORMATION USING ULTRASONICS: ICD-10-CM

## 2021-10-04 DIAGNOSIS — O99.211 OBESITY AFFECTING PREGNANCY IN FIRST TRIMESTER: ICD-10-CM

## 2021-10-04 DIAGNOSIS — Z36.82 ENCOUNTER FOR ANTENATAL SCREENING FOR NUCHAL TRANSLUCENCY: Primary | ICD-10-CM

## 2021-10-04 PROCEDURE — 76801 OB US < 14 WKS SINGLE FETUS: CPT

## 2021-11-01 ENCOUNTER — TRANSCRIBE ORDERS (OUTPATIENT)
Dept: SCHEDULING | Age: 31
End: 2021-11-01
Payer: COMMERCIAL

## 2021-11-01 DIAGNOSIS — Z36.3 ENCOUNTER FOR ANTENATAL SCREENING FOR MALFORMATIONS: Primary | ICD-10-CM

## 2022-01-25 ENCOUNTER — HOSPITAL ENCOUNTER (OUTPATIENT)
Facility: HOSPITAL | Age: 32
End: 2022-01-25
Attending: OBSTETRICS & GYNECOLOGY | Admitting: OBSTETRICS & GYNECOLOGY
Payer: COMMERCIAL

## 2022-03-22 LAB — GP B STREP SPEC QL CULT: NORMAL

## 2022-04-13 ENCOUNTER — HOSPITAL ENCOUNTER (INPATIENT)
Facility: HOSPITAL | Age: 32
LOS: 2 days | Discharge: HOME | End: 2022-04-15
Attending: OBSTETRICS & GYNECOLOGY | Admitting: OBSTETRICS & GYNECOLOGY
Payer: COMMERCIAL

## 2022-04-13 ENCOUNTER — ANESTHESIA EVENT (INPATIENT)
Dept: OBSTETRICS AND GYNECOLOGY | Facility: HOSPITAL | Age: 32
End: 2022-04-13
Payer: COMMERCIAL

## 2022-04-13 ENCOUNTER — ANESTHESIA (INPATIENT)
Dept: OBSTETRICS AND GYNECOLOGY | Facility: HOSPITAL | Age: 32
End: 2022-04-13
Payer: COMMERCIAL

## 2022-04-13 PROBLEM — Z98.891 PREVIOUS CESAREAN SECTION: Status: ACTIVE | Noted: 2022-04-13

## 2022-04-13 LAB
ABO + RH BLD: NORMAL
BLD GP AB SCN SERPL QL: NEGATIVE
D AG BLD QL: POSITIVE
ERYTHROCYTE [DISTWIDTH] IN BLOOD BY AUTOMATED COUNT: 14.5 % (ref 11.7–14.4)
HCT VFR BLDCO AUTO: 35.5 % (ref 35–45)
HGB BLD-MCNC: 11.3 G/DL (ref 11.8–15.7)
LABORATORY COMMENT REPORT: NORMAL
MCH RBC QN AUTO: 27.3 PG (ref 28–33.2)
MCHC RBC AUTO-ENTMCNC: 31.8 G/DL (ref 32.2–35.5)
MCV RBC AUTO: 85.7 FL (ref 83–98)
PDW BLD AUTO: 12.9 FL (ref 9.4–12.3)
PLATELET # BLD AUTO: 246 K/UL (ref 150–369)
RBC # BLD AUTO: 4.14 M/UL (ref 3.93–5.22)
SARS-COV-2 RNA RESP QL NAA+PROBE: NEGATIVE
SPECIMEN EXP DATE BLD: NORMAL
WBC # BLD AUTO: 9.99 K/UL (ref 3.8–10.5)

## 2022-04-13 PROCEDURE — U0002 COVID-19 LAB TEST NON-CDC: HCPCS | Performed by: OBSTETRICS & GYNECOLOGY

## 2022-04-13 PROCEDURE — 63600000 HC DRUGS/DETAIL CODE: Performed by: ANESTHESIOLOGY

## 2022-04-13 PROCEDURE — 85027 COMPLETE CBC AUTOMATED: CPT | Performed by: OBSTETRICS & GYNECOLOGY

## 2022-04-13 PROCEDURE — 86780 TREPONEMA PALLIDUM: CPT | Performed by: OBSTETRICS & GYNECOLOGY

## 2022-04-13 PROCEDURE — 63600000 HC DRUGS/DETAIL CODE

## 2022-04-13 PROCEDURE — 25000000 HC PHARMACY GENERAL: Performed by: ANESTHESIOLOGY

## 2022-04-13 PROCEDURE — 86901 BLOOD TYPING SEROLOGIC RH(D): CPT

## 2022-04-13 PROCEDURE — 36415 COLL VENOUS BLD VENIPUNCTURE: CPT | Performed by: OBSTETRICS & GYNECOLOGY

## 2022-04-13 PROCEDURE — 63700000 HC SELF-ADMINISTRABLE DRUG: Performed by: OBSTETRICS & GYNECOLOGY

## 2022-04-13 PROCEDURE — 25000000 HC PHARMACY GENERAL: Performed by: OBSTETRICS & GYNECOLOGY

## 2022-04-13 PROCEDURE — 63600000 HC DRUGS/DETAIL CODE: Performed by: OBSTETRICS & GYNECOLOGY

## 2022-04-13 PROCEDURE — 27200121 HC CATH FOLEY

## 2022-04-13 PROCEDURE — 37000010 HC ANESTHESIA SPINAL: Performed by: OBSTETRICS & GYNECOLOGY

## 2022-04-13 PROCEDURE — 72000021 HC C SECTION LEVEL 1: Performed by: OBSTETRICS & GYNECOLOGY

## 2022-04-13 PROCEDURE — 12000000 HC ROOM AND CARE MED/SURG

## 2022-04-13 PROCEDURE — 71000001 HC PACU PHASE 1 INITIAL 30MIN: Performed by: OBSTETRICS & GYNECOLOGY

## 2022-04-13 PROCEDURE — 71000011 HC PACU PHASE 1 EA ADDL MIN: Performed by: OBSTETRICS & GYNECOLOGY

## 2022-04-13 RX ORDER — ONDANSETRON HYDROCHLORIDE 2 MG/ML
4 INJECTION, SOLUTION INTRAVENOUS EVERY 6 HOURS PRN
Status: ACTIVE | OUTPATIENT
Start: 2022-04-13 | End: 2022-04-14

## 2022-04-13 RX ORDER — BUPIVACAINE HYDROCHLORIDE 7.5 MG/ML
INJECTION, SOLUTION INTRASPINAL
Status: COMPLETED | OUTPATIENT
Start: 2022-04-13 | End: 2022-04-13

## 2022-04-13 RX ORDER — TRANEXAMIC ACID 10 MG/ML
1000 INJECTION, SOLUTION INTRAVENOUS ONCE AS NEEDED
Status: CANCELLED | OUTPATIENT
Start: 2022-04-13

## 2022-04-13 RX ORDER — NALOXONE HYDROCHLORIDE 0.4 MG/ML
0.04 INJECTION, SOLUTION INTRAMUSCULAR; INTRAVENOUS; SUBCUTANEOUS AS NEEDED
Status: ACTIVE | OUTPATIENT
Start: 2022-04-13 | End: 2022-04-14

## 2022-04-13 RX ORDER — METHYLERGONOVINE MALEATE 0.2 MG/ML
200 INJECTION INTRAVENOUS ONCE AS NEEDED
Status: CANCELLED | OUTPATIENT
Start: 2022-04-13

## 2022-04-13 RX ORDER — AMOXICILLIN 250 MG
1 CAPSULE ORAL 2 TIMES DAILY
Status: DISCONTINUED | OUTPATIENT
Start: 2022-04-13 | End: 2022-04-15 | Stop reason: HOSPADM

## 2022-04-13 RX ORDER — ACETAMINOPHEN 325 MG/1
975 TABLET ORAL
Status: DISCONTINUED | OUTPATIENT
Start: 2022-04-13 | End: 2022-04-15 | Stop reason: HOSPADM

## 2022-04-13 RX ORDER — SODIUM CHLORIDE, SODIUM LACTATE, POTASSIUM CHLORIDE, CALCIUM CHLORIDE 600; 310; 30; 20 MG/100ML; MG/100ML; MG/100ML; MG/100ML
150 INJECTION, SOLUTION INTRAVENOUS CONTINUOUS
Status: DISCONTINUED | OUTPATIENT
Start: 2022-04-13 | End: 2022-04-13

## 2022-04-13 RX ORDER — DIPHENHYDRAMINE HCL 50 MG/ML
12.5 VIAL (ML) INJECTION EVERY 6 HOURS PRN
Status: ACTIVE | OUTPATIENT
Start: 2022-04-13 | End: 2022-04-14

## 2022-04-13 RX ORDER — IBUPROFEN 600 MG/1
600 TABLET ORAL
Status: DISCONTINUED | OUTPATIENT
Start: 2022-04-16 | End: 2022-04-14

## 2022-04-13 RX ORDER — ACETAMINOPHEN 325 MG/1
975 TABLET ORAL ONCE
Status: DISCONTINUED | OUTPATIENT
Start: 2022-04-13 | End: 2022-04-13

## 2022-04-13 RX ORDER — DIPHENHYDRAMINE HCL 25 MG
25 CAPSULE ORAL EVERY 6 HOURS PRN
Status: DISCONTINUED | OUTPATIENT
Start: 2022-04-13 | End: 2022-04-15 | Stop reason: HOSPADM

## 2022-04-13 RX ORDER — CLINDAMYCIN PHOSPHATE 900 MG/50ML
900 INJECTION, SOLUTION INTRAVENOUS
Status: DISCONTINUED | OUTPATIENT
Start: 2022-04-13 | End: 2022-04-13

## 2022-04-13 RX ORDER — KETOROLAC TROMETHAMINE 30 MG/ML
30 INJECTION, SOLUTION INTRAMUSCULAR; INTRAVENOUS
Status: DISCONTINUED | OUTPATIENT
Start: 2022-04-13 | End: 2022-04-14

## 2022-04-13 RX ORDER — CALCIUM CARBONATE 200(500)MG
500 TABLET,CHEWABLE ORAL EVERY 4 HOURS PRN
Status: DISCONTINUED | OUTPATIENT
Start: 2022-04-13 | End: 2022-04-15 | Stop reason: HOSPADM

## 2022-04-13 RX ORDER — ONDANSETRON 4 MG/1
4 TABLET, ORALLY DISINTEGRATING ORAL EVERY 8 HOURS PRN
Status: DISCONTINUED | OUTPATIENT
Start: 2022-04-13 | End: 2022-04-15 | Stop reason: HOSPADM

## 2022-04-13 RX ORDER — MISOPROSTOL 200 UG/1
1000 TABLET ORAL ONCE AS NEEDED
Status: CANCELLED | OUTPATIENT
Start: 2022-04-13

## 2022-04-13 RX ORDER — ACETAMINOPHEN 160 MG/5ML
1000 SUSPENSION ORAL ONCE
Status: DISCONTINUED | OUTPATIENT
Start: 2022-04-13 | End: 2022-04-13

## 2022-04-13 RX ORDER — LEVOTHYROXINE SODIUM 112 UG/1
112 TABLET ORAL
Status: DISCONTINUED | OUTPATIENT
Start: 2022-04-14 | End: 2022-04-15 | Stop reason: HOSPADM

## 2022-04-13 RX ORDER — OXYTOCIN/0.9 % SODIUM CHLORIDE 40/1000ML
500 PLASTIC BAG, INJECTION (ML) INTRAVENOUS ONCE
Status: ACTIVE | OUTPATIENT
Start: 2022-04-13 | End: 2022-04-14

## 2022-04-13 RX ORDER — ALUMINUM HYDROXIDE, MAGNESIUM HYDROXIDE, AND SIMETHICONE 1200; 120; 1200 MG/30ML; MG/30ML; MG/30ML
30 SUSPENSION ORAL EVERY 4 HOURS PRN
Status: DISCONTINUED | OUTPATIENT
Start: 2022-04-13 | End: 2022-04-15 | Stop reason: HOSPADM

## 2022-04-13 RX ORDER — DIPHENHYDRAMINE HCL 50 MG/ML
25 VIAL (ML) INJECTION EVERY 6 HOURS PRN
Status: DISCONTINUED | OUTPATIENT
Start: 2022-04-13 | End: 2022-04-15 | Stop reason: HOSPADM

## 2022-04-13 RX ORDER — MORPHINE SULFATE 0.5 MG/ML
INJECTION, SOLUTION EPIDURAL; INTRATHECAL; INTRAVENOUS
Status: COMPLETED | OUTPATIENT
Start: 2022-04-13 | End: 2022-04-13

## 2022-04-13 RX ORDER — CLINDAMYCIN PHOSPHATE 900 MG/50ML
900 INJECTION, SOLUTION INTRAVENOUS
Status: COMPLETED | OUTPATIENT
Start: 2022-04-13 | End: 2022-04-13

## 2022-04-13 RX ORDER — ONDANSETRON 8 MG/1
8 TABLET, ORALLY DISINTEGRATING ORAL ONCE
Status: COMPLETED | OUTPATIENT
Start: 2022-04-13 | End: 2022-04-13

## 2022-04-13 RX ORDER — SODIUM CITRATE AND CITRIC ACID MONOHYDRATE 334; 500 MG/5ML; MG/5ML
30 SOLUTION ORAL ONCE
Status: COMPLETED | OUTPATIENT
Start: 2022-04-13 | End: 2022-04-13

## 2022-04-13 RX ORDER — OXYTOCIN/0.9 % SODIUM CHLORIDE 40/1000ML
PLASTIC BAG, INJECTION (ML) INTRAVENOUS CONTINUOUS
Status: ACTIVE | OUTPATIENT
Start: 2022-04-13 | End: 2022-04-13

## 2022-04-13 RX ORDER — SODIUM CHLORIDE, SODIUM LACTATE, POTASSIUM CHLORIDE, CALCIUM CHLORIDE 600; 310; 30; 20 MG/100ML; MG/100ML; MG/100ML; MG/100ML
80 INJECTION, SOLUTION INTRAVENOUS CONTINUOUS
Status: ACTIVE | OUTPATIENT
Start: 2022-04-13 | End: 2022-04-14

## 2022-04-13 RX ORDER — NALOXONE HYDROCHLORIDE 0.4 MG/ML
0.1 INJECTION, SOLUTION INTRAMUSCULAR; INTRAVENOUS; SUBCUTANEOUS EVERY 6 HOURS PRN
Status: ACTIVE | OUTPATIENT
Start: 2022-04-13 | End: 2022-04-14

## 2022-04-13 RX ORDER — ACETAMINOPHEN 650 MG/1
650 SUPPOSITORY RECTAL ONCE
Status: DISCONTINUED | OUTPATIENT
Start: 2022-04-13 | End: 2022-04-13

## 2022-04-13 RX ORDER — PROCHLORPERAZINE EDISYLATE 5 MG/ML
10 INJECTION INTRAMUSCULAR; INTRAVENOUS EVERY 6 HOURS PRN
Status: ACTIVE | OUTPATIENT
Start: 2022-04-13 | End: 2022-04-14

## 2022-04-13 RX ORDER — OXYTOCIN/0.9 % SODIUM CHLORIDE 40/1000ML
PLASTIC BAG, INJECTION (ML) INTRAVENOUS CONTINUOUS PRN
Status: DISCONTINUED | OUTPATIENT
Start: 2022-04-13 | End: 2022-04-13 | Stop reason: SURG

## 2022-04-13 RX ORDER — CARBOPROST TROMETHAMINE 250 UG/ML
250 INJECTION, SOLUTION INTRAMUSCULAR ONCE AS NEEDED
Status: CANCELLED | OUTPATIENT
Start: 2022-04-13

## 2022-04-13 RX ORDER — KETOROLAC TROMETHAMINE 30 MG/ML
INJECTION, SOLUTION INTRAMUSCULAR; INTRAVENOUS
Status: COMPLETED
Start: 2022-04-13 | End: 2022-04-13

## 2022-04-13 RX ORDER — OXYTOCIN 10 [USP'U]/ML
10 INJECTION, SOLUTION INTRAMUSCULAR; INTRAVENOUS ONCE AS NEEDED
Status: CANCELLED | OUTPATIENT
Start: 2022-04-13

## 2022-04-13 RX ORDER — ONDANSETRON HYDROCHLORIDE 2 MG/ML
4 INJECTION, SOLUTION INTRAVENOUS EVERY 8 HOURS PRN
Status: DISCONTINUED | OUTPATIENT
Start: 2022-04-13 | End: 2022-04-15 | Stop reason: HOSPADM

## 2022-04-13 RX ORDER — PHENYLEPHRINE HYDROCHLORIDE 10 MG/ML
INJECTION INTRAVENOUS CONTINUOUS PRN
Status: DISCONTINUED | OUTPATIENT
Start: 2022-04-13 | End: 2022-04-13 | Stop reason: SURG

## 2022-04-13 RX ADMIN — ACETAMINOPHEN 975 MG: 325 TABLET, FILM COATED ORAL at 16:45

## 2022-04-13 RX ADMIN — SODIUM CHLORIDE, POTASSIUM CHLORIDE, SODIUM LACTATE AND CALCIUM CHLORIDE 150 ML/HR: 600; 310; 30; 20 INJECTION, SOLUTION INTRAVENOUS at 11:30

## 2022-04-13 RX ADMIN — KETOROLAC TROMETHAMINE 30 MG: 30 INJECTION, SOLUTION INTRAMUSCULAR at 16:46

## 2022-04-13 RX ADMIN — MORPHINE SULFATE 0.15 MG: 0.5 INJECTION, SOLUTION EPIDURAL; INTRATHECAL; INTRAVENOUS at 14:46

## 2022-04-13 RX ADMIN — BUPIVACAINE HYDROCHLORIDE IN DEXTROSE 1.6 ML: 7.5 INJECTION, SOLUTION SUBARACHNOID at 14:46

## 2022-04-13 RX ADMIN — SODIUM CHLORIDE, POTASSIUM CHLORIDE, SODIUM LACTATE AND CALCIUM CHLORIDE 80 ML/HR: 600; 310; 30; 20 INJECTION, SOLUTION INTRAVENOUS at 22:18

## 2022-04-13 RX ADMIN — SODIUM CITRATE AND CITRIC ACID MONOHYDRATE 30 ML: 500; 334 SOLUTION ORAL at 14:21

## 2022-04-13 RX ADMIN — KETOROLAC TROMETHAMINE 30 MG: 30 INJECTION, SOLUTION INTRAMUSCULAR at 22:17

## 2022-04-13 RX ADMIN — ONDANSETRON 8 MG: 8 TABLET, ORALLY DISINTEGRATING ORAL at 14:21

## 2022-04-13 RX ADMIN — PHENYLEPHRINE HYDROCHLORIDE 50 MCG/MIN: 10 INJECTION INTRAVENOUS at 14:49

## 2022-04-13 RX ADMIN — SENNOSIDES AND DOCUSATE SODIUM 1 TABLET: 50; 8.6 TABLET ORAL at 19:41

## 2022-04-13 RX ADMIN — CLINDAMYCIN PHOSPHATE 900 MG: 900 INJECTION, SOLUTION INTRAVENOUS at 14:22

## 2022-04-13 RX ADMIN — Medication 100 ML/HR: at 15:04

## 2022-04-13 RX ADMIN — ACETAMINOPHEN 975 MG: 325 TABLET, FILM COATED ORAL at 22:17

## 2022-04-13 ASSESSMENT — PATIENT HEALTH QUESTIONNAIRE - PHQ9: SUM OF ALL RESPONSES TO PHQ9 QUESTIONS 1 & 2: 0

## 2022-04-13 ASSESSMENT — COGNITIVE AND FUNCTIONAL STATUS - GENERAL: DO YOU HAVE SERIOUS DIFFICULTY WALKING OR CLIMBING STAIRS: NO

## 2022-04-13 ASSESSMENT — ENCOUNTER SYMPTOMS: HEADACHES: 1

## 2022-04-13 NOTE — ANESTHESIA PREPROCEDURE EVALUATION
Anesthesia ROS/MED HX    Anesthesia History    Previous anesthetics  Pulmonary - neg  Neuro/Psych    Headaches  Cardiovascular- neg  Hematological - neg  GI/Hepatic- neg  Musculoskeletal- neg  Renal Disease- neg  Endo/Other   Hypothyroidism    Past Medical History:   Diagnosis Date   • Chronic migraine    • Disease of thyroid gland      Past Surgical History:   Procedure Laterality Date   • WISDOM TOOTH EXTRACTION       Patient Active Problem List   Diagnosis   • Postpartum state       No current facility-administered medications for this encounter.       Prior to Admission medications    Medication Sig Start Date End Date Taking? Authorizing Provider   levothyroxine (SYNTHROID) 125 mcg tablet Take 125 mcg by mouth daily.    Provider, Historical, MD       CBC Results       04/13/22 01/09/20 01/08/20     1125 0629 0037    WBC 9.99 18.61 12.06    RBC 4.14 3.34 4.03    HGB 11.3 9.5 11.5    HCT 35.5 29.5 34.4    MCV 85.7 88.3 85.4    MCH 27.3 28.4 28.5    MCHC 31.8 32.2 33.4     168 203         Comment for HGB at 0629 on 01/09/20: REVIEWED BY TECHNOLOGIST          BMP Results       01/08/20 12/31/19     0037 1914    Creatinine 0.8 0.6    EGFR >60.0 >60.0         Comment for EGFR at 0037 on 01/08/20:   Calculation assumes a body surface area of 1.73 sq. meters.    Comment for EGFR at 1914 on 12/31/19:   Calculation assumes a body surface area of 1.73 sq. meters.            Physical Exam    Airway   Mallampati: II   TM distance: <3 FB   Neck ROM: full  Cardiovascular - normal   Rhythm: regular   Rate: normalPulmonary - normal   clear to auscultation  Other Findings   Back - neg   landmarks identified          Anesthesia Plan    Plan: spinal    Technique: CSE   ASA 2  Anesthetic plan and risks discussed with: patient  Postop Plan:   Patient Disposition: inpatient floor planned admission   Pain Management: epidural, spinal and IV analgesics

## 2022-04-13 NOTE — H&P
Subjective     Patient is a 32 y.o. y.o.  at term   scheduled for  RLTCS . Indications for procedure are previous  section.    Discussed Blood/Blood Products: yes    Prenatal problems:  1. Hypothyroid   2. Asthma    OB History:   OB History    Para Term  AB Living   2 1 1 0 0 1   SAB IAB Ectopic Multiple Live Births   0 0 0 0 1      # Outcome Date GA Lbr Papi/2nd Weight Sex Delivery Anes PTL Lv   2 Current            1 Term 20 40w5d  3.695 kg (8 lb 2.3 oz) M CS-LTranv EPI, Spinal N EDITH      Complications: Failure to Progress in First Stage, Maternal Fever      Name: MARIMAR WOODS      Apgar1: 8  Apgar5: 9       Medical History:   Past Medical History:   Diagnosis Date   • Chronic migraine    • Disease of thyroid gland        Surgical History:   Past Surgical History:   Procedure Laterality Date   • WISDOM TOOTH EXTRACTION         Allergies: Ceclor [cefaclor]    Medications:   Prior to Admission medications    Medication Sig Start Date End Date Taking? Authorizing Provider   levothyroxine (SYNTHROID) 125 mcg tablet Take 125 mcg by mouth daily.    Provider, MD Kenneth            Objective:    There were no vitals taken for this visit.    General:   alert, appears stated age and cooperative   Abdomen:  normal findings: gravid, soft, non-tender   Pelvis:  Exam deferred.   FHT: Reactive   Presentation: cephalic       Labs:    ABO   Date Value Ref Range Status   2022 O  Final     Rh Factor   Date Value Ref Range Status   2022 Positive  Final     Rubella IgG Scr   Date Value Ref Range Status   2019 immune  Final     Strep Gp B Cult/DNA Probe   Date Value Ref Range Status   2019 No Group B Streptococcus Isolated See table below, No growth at 18-24 hours, Probable contaminants, suggest recollection, No growth at 48 hours, No growth at 72 hours, No growth at 96 hours, No growth at 120 hours, No growth at 1 week, No growth at 2 weeks, No growth at 3 weeks, No gr...  Final       Blood type:O+  Antibody screen: negative  Rubella: immune   RPR:  NR  HBsAg: negative  HIV:negative          Lab Results   Component Value Date    WBC 9.99 04/13/2022    HGB 11.3 (L) 04/13/2022    HCT 35.5 04/13/2022    MCV 85.7 04/13/2022     04/13/2022         Assessment/Plan     RLTCS     Counseling: Procedure, risks, reasons, benefits and complications (including injury to bowel, bladder, major blood vessel, ureter, bleeding, possibility of transfusion, infection, or fistula formation) reviewed in detail. Consent signed.  Preop testing ordered.  Instructions reviewed, including NPO after midnight.    Angely Patiño MD

## 2022-04-13 NOTE — ANESTHESIA PROCEDURE NOTES
Spinal Block    Patient location during procedure: OB  Start time: 4/13/2022 2:40 PM  End time: 4/13/2022 2:46 PM  Staffing  Performed: anesthesiologist   Anesthesiologist: Eddie Sheppard MD  Reason for block: labor analgesia requested by patient and obstetrician  Preanesthetic Checklist  Completed: patient identified, site marked, surgical consent, pre-op evaluation, timeout performed, IV checked, risks and benefits discussed, monitors and equipment checked and sterile field maintained during procedure  Spinal Block  Patient position: sitting  Prep: ChloraPrep and site prepped and draped  Patient monitoring: heart rate, cardiac monitor, continuous pulse ox and blood pressure  Approach: midline  Location: L3-4  Injection technique: single-shot  Needle  Needle type: Sprotte   Needle gauge: 24 G  Needle length: 3.5 in  Assessment  Sensory level: T4  Events: cerebrospinal fluid  Additional Notes  Procedure well tolerated. Vital signs stable.      Bupivicaine [id number]  1 Jul 2022    PF Morphine  [id number]  09/2024  Medications Administered - 4/13/2022 2:46 PM   bupivacaine PF (MARCAINE SPINAL) 0.75% intrathecal injection - intrathecal   1.6 mL - 4/13/2022 2:46:00 PM  morphine PF 0.5 mg/mL - intrathecal   0.15 mg - 4/13/2022 2:46:00 PM

## 2022-04-13 NOTE — ANESTHESIOLOGIST PRE-PROCEDURE ATTESTATION
Pre-Procedure Patient Identification:  I am the Primary Anesthesiologist and have identified the patient on 04/13/22 at 1:33 PM.   I have confirmed the procedure(s) will be performed by the following surgeon/proceduralist Angely Patiño MD.      Lucy VILLEGAS

## 2022-04-13 NOTE — OP NOTE
Date of Procedure: 2022  Patient:Luna Felix  :1990    Procedure:     SECTION, ANDREA 22  CPT(R) Code:  58484 - [place] FULL ROUT OBSTE CARE, DELIV    Review the Delivery Report for details.      Details    Pre-Op Diagnosis: 1. Intrauterine pregnancy at 39w2d  2. Previous  section x 1   Post-Op Diagnosis: 1.  Same    Procedure: repeat  , Low Transverse  via low transverse uterine incision and Pfannenstiel skin incision.   Anesthesia: CSE    Findings: Normal appearing uterus, tubes, and ovaries.   EBL: 800 cc   IVF: 1000 cc   UOP: 150 cc clear urine   Complications None     Specimen Information:  * No specimens in log *  Drains: Mcnair draining clear    Staff:  Surgeon(s):  Angely Patiño MD  Anesthesiologist: Eddie Sheppard MD   Circulator: Catherine Harrison RN  Nurse Practitioner: Pura Wills NP  Baby Nurse: Hector George RN  Registered Nurse First Assistant: Taylor, Corrinne, RNFA  Other Staff:  CATHERINE HARRISON;HECTOR GEORGE;PURA WILLS  Circulator;Delivery Nurse;Nurse Practitioner     INFANT INFORMATION  Time of Birth:3:02 PM   Presentation:    Position: , , ,   Cord:  ,Complications:   [place] Sex: female    Weight:       1 Minute 5 Minute 10 Minute   Apgar Totals:              Information for the patient's :  Morgan Felix [[id number]]      Cord Gas     None             Procedure:   After informed consent the patient was taken to the operating room where her placed spinal anesthesia was found to be adequate. She was then prepped and draped in usual sterile fashion in the dorsal supine position with a leftward tilt. A Pfannenstiel skin incision was made with a knife and carried down through to the underlying layer of fascia. The fascia was then incised in the midline and the incision was extended laterally with Newton scissors.  Kocher clamps were placed on the inferior aspect of the fascial  incision.  Attention was then turned to the superior aspect of the fascial incision and the underlying rectus muscles were dissected off with a combination of blunt and sharp dissection. The rectus muscles were  in the midline.  The peritoneum was identified tented up and entered bluntly. The peritoneal incision was extended superiorly and inferiorly with good visualization of the bladder.  A bladder blade was then inserted.  The vesicouterine peritoneum was identified, grasped with the pickups and incised with Metzenbaum scissors. The incision was extended laterally and a bladder flap was created digitally. The bladder blade was then reinserted and a low transverse uterine incision was made with a knife. The incision was extended in the AP diameter with blunt dissection.     The amniotic sac was ruptured and Clear  fluid noted. The bladder blade was removed and the infant's head was delivered atraumatically using the usual maneuvers. The remainder of the infant was delivered, a spontaneous cry was heard, and the infant appeared to be moving all 4 extremities. The infant was delivered in The nose and mouth were suctioned with bulb suction. The cord was doubly clamped and cut and the infant was handed off to the waiting Pediatrician after delayed cord clamping. Cord bloods were not collected as noted above. The placenta then delivered spontaneously intact with a three-vessel cord.     The uterus was exteriorized and cleared of all clots and debris. 0-PDS suture was used to close the uterus in a running locked fashion.Following this the gutters were cleared of all clots and debris and the uterus was returned to the abdomen. There was good hemostasis noted. . The bladder flap and subfascial layers were hemostatic.The rectus muscle bellies were reapproximated with 3.0 Chromic. The fascial incision was closed with 0 PDS in a running fashion. The subcutaneous fat was irrigated with normal sterile saline and was  hemostatic. The subcutaneous tissue was closed with 3.0 Plain gut. The skin was closed with subcutaneous stitches. All sponge lap needle and instrument counts were correct ×2. The patient was taken to the recovery room awake and in stable condition.    Angely Patiño MD  Phone Number: [phone]

## 2022-04-13 NOTE — ANESTHESIA POSTPROCEDURE EVALUATION
Patient: Luna Felix    Procedure Summary     Date: 22 Room / Location:  L&D 1 / [facility] OR    Anesthesia Start: 1432 Anesthesia Stop: 1541    Procedure:  SECTION, ANDREA 22 (N/A ) Diagnosis:       Encounter for maternal care for low transverse scar from repeat  delivery      (Repeat O34.21)    Surgeons: Angely Patiño MD Responsible Provider: Eddie Sheppard MD    Anesthesia Type: spinal ASA Status: 2          Anesthesia Type: spinal  PACU Vitals    No data found in the last 10 encounters.           Anesthesia Post Evaluation    Pain management: adequate  Mode of pain management: additional block medications  Patient participation: complete - patient participated  Level of consciousness: awake and alert  Cardiovascular status: acceptable  Airway Patency: adequate  Respiratory status: acceptable  Hydration status: acceptable  Pain well controlled after spinal preservative free morphine administration: Yes  Continue 24 hours observation after preservative free morphine administration: Yes  Anesthetic complications: no

## 2022-04-14 LAB
ERYTHROCYTE [DISTWIDTH] IN BLOOD BY AUTOMATED COUNT: 14.8 % (ref 11.7–14.4)
HCT VFR BLDCO AUTO: 31.7 % (ref 35–45)
HGB BLD-MCNC: 10.1 G/DL (ref 11.8–15.7)
MCH RBC QN AUTO: 27.5 PG (ref 28–33.2)
MCHC RBC AUTO-ENTMCNC: 31.9 G/DL (ref 32.2–35.5)
MCV RBC AUTO: 86.4 FL (ref 83–98)
PDW BLD AUTO: 12.8 FL (ref 9.4–12.3)
PLATELET # BLD AUTO: 227 K/UL (ref 150–369)
RBC # BLD AUTO: 3.67 M/UL (ref 3.93–5.22)
T PALLIDUM AB SER QL IF: NONREACTIVE
WBC # BLD AUTO: 11.36 K/UL (ref 3.8–10.5)

## 2022-04-14 PROCEDURE — 63600000 HC DRUGS/DETAIL CODE: Performed by: OBSTETRICS & GYNECOLOGY

## 2022-04-14 PROCEDURE — 12000000 HC ROOM AND CARE MED/SURG

## 2022-04-14 PROCEDURE — 63700000 HC SELF-ADMINISTRABLE DRUG: Performed by: OBSTETRICS & GYNECOLOGY

## 2022-04-14 PROCEDURE — 85027 COMPLETE CBC AUTOMATED: CPT | Performed by: OBSTETRICS & GYNECOLOGY

## 2022-04-14 PROCEDURE — 36415 COLL VENOUS BLD VENIPUNCTURE: CPT | Performed by: OBSTETRICS & GYNECOLOGY

## 2022-04-14 RX ORDER — IBUPROFEN 600 MG/1
600 TABLET ORAL EVERY 6 HOURS
Status: DISCONTINUED | OUTPATIENT
Start: 2022-04-14 | End: 2022-04-15 | Stop reason: HOSPADM

## 2022-04-14 RX ADMIN — SENNOSIDES AND DOCUSATE SODIUM 1 TABLET: 50; 8.6 TABLET ORAL at 20:26

## 2022-04-14 RX ADMIN — IBUPROFEN 600 MG: 600 TABLET ORAL at 22:54

## 2022-04-14 RX ADMIN — ACETAMINOPHEN 975 MG: 325 TABLET, FILM COATED ORAL at 10:57

## 2022-04-14 RX ADMIN — LEVOTHYROXINE SODIUM 112 MCG: 0.11 TABLET ORAL at 06:20

## 2022-04-14 RX ADMIN — ACETAMINOPHEN 975 MG: 325 TABLET, FILM COATED ORAL at 16:55

## 2022-04-14 RX ADMIN — ACETAMINOPHEN 975 MG: 325 TABLET, FILM COATED ORAL at 22:54

## 2022-04-14 RX ADMIN — KETOROLAC TROMETHAMINE 30 MG: 30 INJECTION, SOLUTION INTRAMUSCULAR at 16:55

## 2022-04-14 RX ADMIN — ACETAMINOPHEN 975 MG: 325 TABLET, FILM COATED ORAL at 05:13

## 2022-04-14 RX ADMIN — KETOROLAC TROMETHAMINE 30 MG: 30 INJECTION, SOLUTION INTRAMUSCULAR at 10:57

## 2022-04-14 RX ADMIN — KETOROLAC TROMETHAMINE 30 MG: 30 INJECTION, SOLUTION INTRAMUSCULAR at 05:13

## 2022-04-14 RX ADMIN — PRENATAL VITAMINS-IRON FUMARATE 27 MG IRON-FOLIC ACID 0.8 MG TABLET 1 TABLET: at 10:09

## 2022-04-14 RX ADMIN — SENNOSIDES AND DOCUSATE SODIUM 1 TABLET: 50; 8.6 TABLET ORAL at 10:09

## 2022-04-14 NOTE — PROGRESS NOTES
Patient: Luna Felix  Procedure(s):   SECTION, ANDREA 22  Location: [facility]D OR    Last vitals:   Vitals:    22 0334   BP: (!) 115/55   Pulse: 74   Resp: 18   Temp: 36.3 °C (97.3 °F)   SpO2: 97%     Level of consciousness: Awake, Alert, Oriented  Post-anesthesia pain: Adequate analgesia  Anesthetic complications: None  Nausea and Vomiting Controled: Yes  Hydration: Adequate  Cardiovascular Function: Stable  Neuro Exam: WNL  Respiration: WNL  Pain is well controlled after spinal preservative free morphine administration and additional IV/PO meds:  Continue 24 hours observation after preservative free morphine administration:

## 2022-04-14 NOTE — LACTATION NOTE
This note was copied from a baby's chart.  P2 Mom reports baby is bf well, she does have a coty pipe spot on her left breast, reviewed nipple care. Her first never latched so she pumped for 7 months, happy this one is latching. Reviewed Bf Basics, output, positioning and feeding frequency.Lactation team will follow.

## 2022-04-14 NOTE — PLAN OF CARE
Pt OOB with assistance. Incision CDI. Mcnair patent, draining appropriately. Pt resting, VSS.   Problem: Adult Inpatient Plan of Care  Goal: Plan of Care Review  Outcome: Progressing  Goal: Patient-Specific Goal (Individualized)  Outcome: Progressing  Goal: Absence of Hospital-Acquired Illness or Injury  Outcome: Progressing  Goal: Optimal Comfort and Wellbeing  Outcome: Progressing  Goal: Readiness for Transition of Care  Outcome: Progressing

## 2022-04-14 NOTE — PROGRESS NOTES
Obstetrics Postpartum Progress Note    Events  None    Subjective  Pain: well-controlled  Bleeding: lochia minimal  Diet: taking regular diet  Voiding: without difficulty  Ambulating: as tolerated  Feeding: plans to breastfeed    Vitals  Temp:  [36.2 °C (97.2 °F)-36.7 °C (98.1 °F)] 36.6 °C (97.9 °F)  Heart Rate:  [71-92] 88  Resp:  [18] 18  BP: ()/(50-80) 114/58  Body mass index is 35.51 kg/m².    I&O    Intake/Output Summary (Last 24 hours) at 2022 1337  Last data filed at 2022 0600  Gross per 24 hour   Intake 1500 ml   Output 3150 ml   Net -1650 ml       Physical Exam  General: well  Abdomen: soft, nondistended, appropriately tender  Incision: c/d/I, bandage in place  Fundus: firm and at umbilicus  Extremities: symmetric, no edema bilaterally    Labs  Labs Reviewed:  Lab Results   Component Value Date    ABO O 2022    LABRH Positive 2022        Assessment/Plan   Problem-based Assessment and Plan    Luna Felix is a 32 y.o.  postop day 1 s/p rLTCS    - Meeting post-op milestones  - Hgb 11.3 pre-op -> 10.1 post-op  - Cont routine post-op advancement  - Encouraged ambulation/OOB  - Anticipate discharge home on POD#3  - Reviewed all discharge instructions, precautions, follow-up appointments, discharge medications. All questions answered.    Yuli Mascorro MD  2022 1:37 PM

## 2022-04-15 VITALS
RESPIRATION RATE: 18 BRPM | TEMPERATURE: 98.1 F | WEIGHT: 220 LBS | BODY MASS INDEX: 35.36 KG/M2 | SYSTOLIC BLOOD PRESSURE: 138 MMHG | HEART RATE: 94 BPM | HEIGHT: 66 IN | DIASTOLIC BLOOD PRESSURE: 78 MMHG | OXYGEN SATURATION: 97 %

## 2022-04-15 PROCEDURE — 63700000 HC SELF-ADMINISTRABLE DRUG: Performed by: OBSTETRICS & GYNECOLOGY

## 2022-04-15 RX ORDER — OXYCODONE HYDROCHLORIDE 5 MG/1
5 TABLET ORAL EVERY 4 HOURS PRN
Qty: 5 TABLET | Refills: 0 | Status: SHIPPED | OUTPATIENT
Start: 2022-04-15 | End: 2022-04-20

## 2022-04-15 RX ORDER — ACETAMINOPHEN 325 MG/1
975 TABLET ORAL
Qty: 360 TABLET | Refills: 0 | Status: SHIPPED | OUTPATIENT
Start: 2022-04-15 | End: 2022-05-15

## 2022-04-15 RX ORDER — IBUPROFEN 600 MG/1
600 TABLET ORAL EVERY 6 HOURS
Qty: 40 TABLET | Refills: 0 | Status: SHIPPED | OUTPATIENT
Start: 2022-04-15 | End: 2024-10-21

## 2022-04-15 RX ADMIN — ACETAMINOPHEN 975 MG: 325 TABLET, FILM COATED ORAL at 10:52

## 2022-04-15 RX ADMIN — IBUPROFEN 600 MG: 600 TABLET ORAL at 04:54

## 2022-04-15 RX ADMIN — PRENATAL VITAMINS-IRON FUMARATE 27 MG IRON-FOLIC ACID 0.8 MG TABLET 1 TABLET: at 08:23

## 2022-04-15 RX ADMIN — ACETAMINOPHEN 975 MG: 325 TABLET, FILM COATED ORAL at 04:54

## 2022-04-15 RX ADMIN — SENNOSIDES AND DOCUSATE SODIUM 1 TABLET: 50; 8.6 TABLET ORAL at 08:23

## 2022-04-15 RX ADMIN — IBUPROFEN 600 MG: 600 TABLET ORAL at 10:52

## 2022-04-15 RX ADMIN — LEVOTHYROXINE SODIUM 112 MCG: 0.11 TABLET ORAL at 06:29

## 2022-04-15 NOTE — NURSING NOTE
Discharge instructions reviewed with patient. Patient identified with  bands, discharged to home with infant in car seat.

## 2022-04-15 NOTE — PROGRESS NOTES
Obstetrics Postpartum Progress Note    Events  No acute events overnight.    Subjective  Pain: no  Diet: taking regular diet  Voiding: without difficulty  Bowel: passing flatus  Ambulating: as tolerated    Vitals  Temp:  [36.6 °C (97.9 °F)-36.7 °C (98.1 °F)] 36.7 °C (98.1 °F)  Heart Rate:  [77-94] 94  Resp:  [16-18] 18  BP: (117-138)/(57-78) 138/78    I&O  No intake or output data in the 24 hours ending 04/15/22 1056    Physical Exam  General: well  Abdomen: soft, nondistended, non-tender  Fundus: below umbilicus  Incision: dressing clean, dry, intact  Perineum: deferred  Extremities: symmetric    Labs  Labs Reviewed:  Lab Results   Component Value Date    ABO O 2022    LABRH Positive 2022      CBC Results       22     0645 1125 0629    WBC 11.36 9.99 18.61    RBC 3.67 4.14 3.34    HGB 10.1 11.3 9.5    HCT 31.7 35.5 29.5    MCV 86.4 85.7 88.3    MCH 27.5 27.3 28.4    MCHC 31.9 31.8 32.2     246 168         Comment for HGB at 0629 on 20: REVIEWED BY TECHNOLOGIST            Assessment/Plan   Problem-based Assessment and Plan    Luna Felix is a 32 y.o.  postop day 2 s/p , Low Transverse .    1. Vital Signs: stable  2. Hemodynamics: stable  3. Pain: controlled  4. VTE Assessment: Early Ambulation  5. Postpartum care: meeting all goals   Would like to go home today  Angely Patiño MD

## 2022-04-15 NOTE — DISCHARGE SUMMARY
Inpatient Discharge Summary    BRIEF OVERVIEW  Discharge Provider: Angely Patiño MD  Primary care provider: Luna Berrios PA C    Admission Date: 2022     Discharge Date: 4/15/2022    Discharge Diagnosis  Postpartum state  S/P repeat LTCS     Discharge Disposition  Home    Discharge Medications     Medication List      START taking these medications    acetaminophen 325 mg tablet  Commonly known as: TYLENOL  Take 3 tablets (975 mg total) by mouth every 6 (six) hours.  Dose: 975 mg     ibuprofen 600 mg tablet  Commonly known as: MOTRIN  Take 1 tablet (600 mg total) by mouth every 6 (six) hours for 10 days.  Dose: 600 mg     oxyCODONE 5 mg immediate release tablet  Commonly known as: ROXICODONE  Take 1 tablet (5 mg total) by mouth every 4 (four) hours as needed for moderate pain for up to 5 days.  Dose: 5 mg        CONTINUE taking these medications    levothyroxine 125 mcg tablet  Commonly known as: SYNTHROID  Take 125 mcg by mouth daily.  Dose: 125 mcg            Outpatient Follow-Up  Postpartum followup in 6 weeks        DETAILS OF HOSPITAL STAY      Luna Felix is a  who presented to L&D and had Labor/Delivery:  (repeat ).  She had a routine postpartum course and was discharged in stable condition. Discharge instructions were reviewed in detail.     She will follow up in 6 weeks.     Relevant consults: none  Relevant labs: none

## 2024-10-20 SDOH — ECONOMIC STABILITY: TRANSPORTATION INSECURITY
IN THE PAST 12 MONTHS, HAS LACK OF TRANSPORTATION KEPT YOU FROM MEETINGS, WORK, OR FROM GETTING THINGS NEEDED FOR DAILY LIVING?: NO

## 2024-10-20 SDOH — ECONOMIC STABILITY: FOOD INSECURITY: WITHIN THE PAST 12 MONTHS, YOU WORRIED THAT YOUR FOOD WOULD RUN OUT BEFORE YOU GOT MONEY TO BUY MORE.: NEVER TRUE

## 2024-10-20 SDOH — ECONOMIC STABILITY: INCOME INSECURITY: IN THE LAST 12 MONTHS, WAS THERE A TIME WHEN YOU WERE NOT ABLE TO PAY THE MORTGAGE OR RENT ON TIME?: NO

## 2024-10-20 SDOH — ECONOMIC STABILITY: FOOD INSECURITY: WITHIN THE PAST 12 MONTHS, THE FOOD YOU BOUGHT JUST DIDN'T LAST AND YOU DIDN'T HAVE MONEY TO GET MORE.: NEVER TRUE

## 2024-10-20 SDOH — ECONOMIC STABILITY: TRANSPORTATION INSECURITY
IN THE PAST 12 MONTHS, HAS THE LACK OF TRANSPORTATION KEPT YOU FROM MEDICAL APPOINTMENTS OR FROM GETTING MEDICATIONS?: NO

## 2024-10-20 ASSESSMENT — SOCIAL DETERMINANTS OF HEALTH (SDOH): IN THE PAST 12 MONTHS, HAS THE ELECTRIC, GAS, OIL, OR WATER COMPANY THREATENED TO SHUT OFF SERVICE IN YOUR HOME?: NO

## 2024-10-21 ENCOUNTER — OFFICE VISIT (OUTPATIENT)
Dept: PRIMARY CARE | Facility: CLINIC | Age: 34
End: 2024-10-21
Payer: COMMERCIAL

## 2024-10-21 VITALS
DIASTOLIC BLOOD PRESSURE: 80 MMHG | SYSTOLIC BLOOD PRESSURE: 120 MMHG | OXYGEN SATURATION: 99 % | WEIGHT: 205 LBS | RESPIRATION RATE: 14 BRPM | HEART RATE: 80 BPM | HEIGHT: 65 IN | BODY MASS INDEX: 34.16 KG/M2

## 2024-10-21 DIAGNOSIS — N39.0 FREQUENT UTI: ICD-10-CM

## 2024-10-21 DIAGNOSIS — E03.9 ACQUIRED HYPOTHYROIDISM: Primary | ICD-10-CM

## 2024-10-21 DIAGNOSIS — E66.9 OBESITY (BMI 35.0-39.9 WITHOUT COMORBIDITY): ICD-10-CM

## 2024-10-21 PROCEDURE — 3008F BODY MASS INDEX DOCD: CPT | Performed by: PHYSICIAN ASSISTANT

## 2024-10-21 PROCEDURE — 99214 OFFICE O/P EST MOD 30 MIN: CPT | Performed by: PHYSICIAN ASSISTANT

## 2024-10-21 RX ORDER — TIRZEPATIDE 10 MG/.5ML
10 INJECTION, SOLUTION SUBCUTANEOUS
Qty: 6 ML | Refills: 1 | Status: SHIPPED | OUTPATIENT
Start: 2024-10-21 | End: 2025-01-14

## 2024-10-21 RX ORDER — NITROFURANTOIN 25; 75 MG/1; MG/1
100 CAPSULE ORAL DAILY PRN
Qty: 30 CAPSULE | Refills: 1 | Status: SHIPPED | OUTPATIENT
Start: 2024-10-21 | End: 2024-12-20

## 2024-10-21 RX ORDER — TIRZEPATIDE 7.5 MG/.5ML
INJECTION, SOLUTION SUBCUTANEOUS
COMMUNITY
End: 2025-01-14

## 2024-10-21 RX ORDER — NITROFURANTOIN 25; 75 MG/1; MG/1
1 CAPSULE ORAL EVERY 12 HOURS
COMMUNITY
Start: 2023-12-26 | End: 2024-10-21 | Stop reason: SDUPTHER

## 2024-10-21 RX ORDER — MONTELUKAST SODIUM 10 MG/1
10 TABLET ORAL EVERY EVENING
COMMUNITY
Start: 2024-09-03

## 2024-10-21 RX ORDER — FLUTICASONE FUROATE AND VILANTEROL 100; 25 UG/1; UG/1
POWDER RESPIRATORY (INHALATION)
COMMUNITY
Start: 2024-08-30

## 2024-10-21 RX ORDER — LEVOTHYROXINE SODIUM 112 UG/1
112 TABLET ORAL
Qty: 30 TABLET | Refills: 3 | Status: SHIPPED | OUTPATIENT
Start: 2024-10-21

## 2024-10-21 RX ORDER — ALBUTEROL SULFATE 90 UG/1
INHALANT RESPIRATORY (INHALATION)
COMMUNITY
Start: 2024-08-20

## 2024-10-21 ASSESSMENT — ENCOUNTER SYMPTOMS
DIZZINESS: 0
ABDOMINAL PAIN: 0
DIARRHEA: 0
CHILLS: 0
VOMITING: 0
FATIGUE: 0
SHORTNESS OF BREATH: 0
HEADACHES: 0
WHEEZING: 0
COUGH: 1
NAUSEA: 0

## 2024-10-21 ASSESSMENT — PATIENT HEALTH QUESTIONNAIRE - PHQ9: SUM OF ALL RESPONSES TO PHQ9 QUESTIONS 1 & 2: 0

## 2024-10-21 NOTE — PROGRESS NOTES
LOLA Horn    [facility] in [address] [address]   P: [phone]  F: [phone]       Reason for visit:   Luna Felix is a 34 y.o. female who presents for   Chief Complaint   Patient presents with    Follow-up     Pt stated she got her physical last spring          HPI    Patient presents for follow up.   Doing great on the Zepbound 7.5mg. No side effects. Has lost about 30 pounds.    Has been eating more balanced, exercising.     She did recently have a URI, needed to see . Still with mild, dry cough but notes it is improving.  She continues her inhalers.      Stable on Levothyroxine.     Last PE was in March.    Past Medical History:   Diagnosis Date    Chronic migraine     Disease of thyroid gland     Hypothyroidism     Obesity (BMI 35.0-39.9 without comorbidity)      Past Surgical History   Procedure Laterality Date     SECTION N/A 2020    Performed by Nava Forrest MD at [facility]     SECTION, ANDREA 22 N/A 2022    Performed by Angely Patiño MD at [facility]    [place] tooth extraction       Social History     Tobacco Use    Smoking status: Never    Smokeless tobacco: Never   Vaping Use    Vaping status: Never Used   Substance Use Topics    Alcohol use: Never    Drug use: Never     Family History   Problem Relation Name Age of Onset    No Known Problems Biological Mother      Hypertension Biological Father      Heart block Biological Father      No Known Problems Biological Sister      Cancer Maternal Grandmother      Stroke Maternal Grandfather      Cancer Paternal Grandmother      Stroke Paternal Grandfather       Allergies   Allergen Reactions    Ceclor [Cefaclor] Hives       Current Outpatient Medications:     albuterol HFA 90 mcg/actuation inhaler, USE 2 PUFFS 4 TIMES A DAY, Disp: , Rfl:     fluticasone furoate-vilanteroL (BREO ELLIPTA) 100-25 mcg/dose powder for inhalation, USE 1 INHALATION  "EVERY DAY, Disp: , Rfl:     ibuprofen (MOTRIN) 600 mg tablet, Take 1 tablet (600 mg total) by mouth every 6 (six) hours for 10 days., Disp: 40 tablet, Rfl: 0    levothyroxine (SYNTHROID) 112 mcg tablet, Take 1 tablet (112 mcg total) by mouth daily., Disp: 30 tablet, Rfl: 3    montelukast (SINGULAIR) 10 mg tablet, Take 10 mg by mouth every evening., Disp: , Rfl:     nitrofurantoin, macrocrystal-monohydrate, (MACROBID) 100 mg capsule, Take 1 capsule (100 mg total) by mouth daily as needed (as needed after intercourse)., Disp: 30 capsule, Rfl: 1    tirzepatide, weight loss, (ZEPBOUND) 10 mg/0.5 mL subcutaneous PEN injector, Inject 0.5 mL (10 mg total) under the skin every (seven) 7 days., Disp: 6 mL, Rfl: 1    ZEPBOUND 7.5 mg/0.5 mL subcutaneous PEN injector, , Disp: , Rfl:     Review of Systems   Constitutional:  Negative for chills and fatigue.   Respiratory:  Positive for cough. Negative for shortness of breath and wheezing.    Cardiovascular:  Negative for chest pain.   Gastrointestinal:  Negative for abdominal pain, diarrhea, nausea and vomiting.   Neurological:  Negative for dizziness and headaches.      Objective    Vitals:    10/21/24 0959   BP: 120/80   BP Location: Left upper arm   Patient Position: Sitting   Pulse: 80   Resp: 14   SpO2: 99%   Weight: 93 kg (205 lb)   Height: 1.638 m (5' 4.5\")     Body mass index is 34.64 kg/m².    Physical Exam  Constitutional:       Appearance: Normal appearance.   HENT:      Head: Normocephalic and atraumatic.   Cardiovascular:      Rate and Rhythm: Normal rate and regular rhythm.      Pulses: Normal pulses.      Heart sounds: Normal heart sounds.   Pulmonary:      Effort: Pulmonary effort is normal. No respiratory distress.      Breath sounds: Normal breath sounds. No wheezing or rales.   Abdominal:      General: Abdomen is flat. There is no distension.      Tenderness: There is no abdominal tenderness. There is no guarding.   Skin:     General: Skin is warm and dry.      " Findings: No rash.   Neurological:      General: No focal deficit present.      Mental Status: She is alert and oriented to person, place, and time.   Psychiatric:         Mood and Affect: Mood normal.         Behavior: Behavior normal.                Assessment   Diagnoses and all orders for this visit:    Acquired hypothyroidism (Primary)  Assessment & Plan:  Stable, continue current management.    Orders:  -     levothyroxine (SYNTHROID) 112 mcg tablet; Take 1 tablet (112 mcg total) by mouth daily.    Obesity (BMI 35.0-39.9 without comorbidity)  Assessment & Plan:  Continue Zepbound. Increase dose to 10mg, has been on 7.5mg for about three months and weight loss has slowed down. F/u in 3 months with update.     Orders:  -     tirzepatide, weight loss, (ZEPBOUND) 10 mg/0.5 mL subcutaneous PEN injector; Inject 0.5 mL (10 mg total) under the skin every (seven) 7 days.    Frequent UTI  Assessment & Plan:  Pt takes Macrobid as needed after intercourse for UTI prevention. Has not had UTI since starting prophylactic tx  - continue.    Orders:  -     nitrofurantoin, macrocrystal-monohydrate, (MACROBID) 100 mg capsule; Take 1 capsule (100 mg total) by mouth daily as needed (as needed after intercourse).            LOLA Horn      10/21/2024

## 2024-10-21 NOTE — ASSESSMENT & PLAN NOTE
Pt takes Macrobid as needed after intercourse for UTI prevention. Has not had UTI since starting prophylactic tx  - continue.

## 2024-10-21 NOTE — ASSESSMENT & PLAN NOTE
Continue Zepbound. Increase dose to 10mg, has been on 7.5mg for about three months and weight loss has slowed down. F/u in 3 months with update.

## (undated) DEVICE — Device

## (undated) DEVICE — PACK OR TOWEL

## (undated) DEVICE — SUTURE MONOCRYL 4-0  Y496G PS-2 I8IN

## (undated) DEVICE — PUMP BOTTLE CAVILON FILM BARRIER NO STING

## (undated) DEVICE — SYRINGE BULB 60CC

## (undated) DEVICE — SUTURE MONOCRYL 0 Y946H

## (undated) DEVICE — CONTAINER SPECIMEN STERILE 5OZ

## (undated) DEVICE — ***USE 56892*** SUTURE CHROMIC GUT 1-0  905H

## (undated) DEVICE — SPONGE CURITY GAUZE 4

## (undated) DEVICE — ***USE 57698*** SLEEVE FLOWTRON DVT CALF SINGLE USE

## (undated) DEVICE — ***USE 56952*** SUTURE VICRYL 1 J371H CTX 36IN VIOLET

## (undated) DEVICE — ***USE 138794*** SUTURE CHROMIC GUT 3-0  810H

## (undated) DEVICE — STERILE BLOOD COLLECTION TUBES

## (undated) DEVICE — DRESSING TELFA 3X8

## (undated) DEVICE — SPONGE LAP 18X18 SAFE-T RFID ENHANCED XRAY

## (undated) DEVICE — APPLICATOR CHLORAPREP 26ML ORANGE TINT

## (undated) DEVICE — SPONGE LAP DISPOSABLE 18X18

## (undated) DEVICE — PENEVAC1 NONSTICK SMOKE EVAC

## (undated) DEVICE — PENCIL ESU HANDSWITCHING W/HOL

## (undated) DEVICE — PAD GROUND ELECTROSURGICAL W/CORD

## (undated) DEVICE — ***USE 56955*** SUTURE VICRYL 2-0  J339H CT-1

## (undated) DEVICE — PACK RFID MLH C SECTION

## (undated) DEVICE — DRESSING ABD STERILE 7X8IN

## (undated) DEVICE — ***USE 138841*** SUTURE PDS II  0  Z370T

## (undated) DEVICE — ***USE 56937*** SUTURE PLAIN GUT 2-0   843H

## (undated) DEVICE — PACK C-SECTION RMH